# Patient Record
Sex: MALE | NOT HISPANIC OR LATINO | URBAN - METROPOLITAN AREA
[De-identification: names, ages, dates, MRNs, and addresses within clinical notes are randomized per-mention and may not be internally consistent; named-entity substitution may affect disease eponyms.]

---

## 2017-02-08 ENCOUNTER — APPOINTMENT (RX ONLY)
Dept: URBAN - METROPOLITAN AREA CLINIC 140 | Facility: CLINIC | Age: 67
Setting detail: DERMATOLOGY
End: 2017-02-08

## 2017-02-08 DIAGNOSIS — L57.8 OTHER SKIN CHANGES DUE TO CHRONIC EXPOSURE TO NONIONIZING RADIATION: ICD-10-CM

## 2017-02-08 DIAGNOSIS — D485 NEOPLASM OF UNCERTAIN BEHAVIOR OF SKIN: ICD-10-CM

## 2017-02-08 DIAGNOSIS — Z85.828 PERSONAL HISTORY OF OTHER MALIGNANT NEOPLASM OF SKIN: ICD-10-CM

## 2017-02-08 PROBLEM — D48.5 NEOPLASM OF UNCERTAIN BEHAVIOR OF SKIN: Status: ACTIVE | Noted: 2017-02-08

## 2017-02-08 PROBLEM — H91.90 UNSPECIFIED HEARING LOSS, UNSPECIFIED EAR: Status: ACTIVE | Noted: 2017-02-08

## 2017-02-08 PROCEDURE — ? BIOPSY BY SHAVE METHOD

## 2017-02-08 PROCEDURE — 99213 OFFICE O/P EST LOW 20 MIN: CPT | Mod: 25

## 2017-02-08 PROCEDURE — 11100: CPT

## 2017-02-08 PROCEDURE — ? COUNSELING

## 2017-02-08 ASSESSMENT — LOCATION DETAILED DESCRIPTION DERM
LOCATION DETAILED: RIGHT PROXIMAL DORSAL FOREARM
LOCATION DETAILED: RIGHT MEDIAL FOREHEAD
LOCATION DETAILED: LEFT PROXIMAL DORSAL FOREARM
LOCATION DETAILED: RIGHT MID TEMPLE

## 2017-02-08 ASSESSMENT — LOCATION ZONE DERM
LOCATION ZONE: FACE
LOCATION ZONE: ARM

## 2017-02-08 ASSESSMENT — LOCATION SIMPLE DESCRIPTION DERM
LOCATION SIMPLE: LEFT FOREARM
LOCATION SIMPLE: RIGHT FOREHEAD
LOCATION SIMPLE: RIGHT FOREARM
LOCATION SIMPLE: RIGHT TEMPLE

## 2017-02-08 NOTE — PROCEDURE: MIPS QUALITY
Quality 110: Preventive Care And Screening: Influenza Immunization: Influenza immunization was not ordered or administered, reason not given
Quality 402: Tobacco Use And Help With Quitting Among Adolescents: Patient screened for tobacco and never smoked
Quality 131: Pain Assessment And Follow-Up: No documentation of pain assessment, reason not given
Quality 47: Advance Care Plan: Advance care planning not documented, reason not otherwise specified.
Quality 154 Part A: Falls: Risk Assessment (Should Be Reported With Measure 155.): Falls risk assessment not completed, reason not otherwise specified
Quality 111:Pneumonia Vaccination Status For Older Adults: Pneumococcal Vaccination not Administered or Previously Received, Reason not Otherwise Specified
Quality 154 Part B: Falls: Risk Screening (Should Be Reported With Measure 155.): Patient screened for future fall risk; documentation of no falls in the past year or only one fall without injury in the past year
Quality 173: Preventive Care And Screening: Unhealthy Alcohol Use - Screening: Unhealthy alcohol use screening not performed, reason not otherwise specified
Detail Level: Detailed

## 2017-02-08 NOTE — PROCEDURE: BIOPSY BY SHAVE METHOD
Anesthesia Volume In Cc: 0.5
Bill 47985 For Specimen Handling/Conveyance To Laboratory?: no
Post-Care Instructions: I reviewed with the patient in detail post-care instructions. Patient is to keep the biopsy site dry overnight, and then apply bacitracin twice daily until healed. Patient may apply hydrogen peroxide soaks to remove any crusting.
Hemostasis: Electrocautery
Silver Nitrate Text: The wound bed was treated with silver nitrate after the biopsy was performed.
Notification Instructions: Patient will be notified of biopsy results. However, patient instructed to call the office if not contacted within 2 weeks.
Electrodesiccation Text: The wound bed was treated with electrodesiccation after the biopsy was performed.
Type Of Destruction Used: Curettage
Wound Care: Vaseline
X Size Of Lesion In Cm: 0
Biopsy Type: H and E
Dressing: bandage
Curettage Text: The wound bed was treated with curettage after the biopsy was performed.
Biopsy Method: Dermablade
Consent: Written consent was obtained and risks were reviewed including but not limited to scarring, infection, bleeding, scabbing, incomplete removal, nerve damage and allergy to anesthesia.
Billing Type: Third-Party Bill
Detail Level: Simple
Cryotherapy Text: The wound bed was treated with cryotherapy after the biopsy was performed.
Anesthesia Type: 1% lidocaine with epinephrine
Electrodesiccation And Curettage Text: The wound bed was treated with electrodesiccation and curettage after the biopsy was performed.

## 2017-04-03 ENCOUNTER — APPOINTMENT (RX ONLY)
Dept: URBAN - METROPOLITAN AREA CLINIC 140 | Facility: CLINIC | Age: 67
Setting detail: DERMATOLOGY
End: 2017-04-03

## 2017-04-03 DIAGNOSIS — L82.0 INFLAMED SEBORRHEIC KERATOSIS: ICD-10-CM

## 2017-04-03 DIAGNOSIS — L82.1 OTHER SEBORRHEIC KERATOSIS: ICD-10-CM

## 2017-04-03 DIAGNOSIS — L57.0 ACTINIC KERATOSIS: ICD-10-CM

## 2017-04-03 PROBLEM — Z92.3 PERSONAL HISTORY OF IRRADIATION: Status: ACTIVE | Noted: 2017-04-03

## 2017-04-03 PROCEDURE — ? BENIGN DESTRUCTION

## 2017-04-03 PROCEDURE — 17000 DESTRUCT PREMALG LESION: CPT | Mod: 59

## 2017-04-03 PROCEDURE — 17110 DESTRUCTION B9 LES UP TO 14: CPT

## 2017-04-03 PROCEDURE — ? COUNSELING

## 2017-04-03 PROCEDURE — 17003 DESTRUCT PREMALG LES 2-14: CPT

## 2017-04-03 PROCEDURE — ? LIQUID NITROGEN

## 2017-04-03 PROCEDURE — 99213 OFFICE O/P EST LOW 20 MIN: CPT | Mod: 25

## 2017-04-03 ASSESSMENT — LOCATION DETAILED DESCRIPTION DERM
LOCATION DETAILED: LEFT PROXIMAL DORSAL FOREARM
LOCATION DETAILED: LEFT MEDIAL EYEBROW
LOCATION DETAILED: LEFT INFERIOR TEMPLE
LOCATION DETAILED: RIGHT INFERIOR POSTERIOR NECK
LOCATION DETAILED: RIGHT INFERIOR FOREHEAD
LOCATION DETAILED: RIGHT MEDIAL ZYGOMA
LOCATION DETAILED: LEFT LATERAL INFERIOR EYELID
LOCATION DETAILED: RIGHT MEDIAL TRAPEZIAL NECK

## 2017-04-03 ASSESSMENT — LOCATION ZONE DERM
LOCATION ZONE: NECK
LOCATION ZONE: FACE
LOCATION ZONE: ARM
LOCATION ZONE: EYELID

## 2017-04-03 ASSESSMENT — LOCATION SIMPLE DESCRIPTION DERM
LOCATION SIMPLE: LEFT INFERIOR EYELID
LOCATION SIMPLE: LEFT TEMPLE
LOCATION SIMPLE: LEFT FOREARM
LOCATION SIMPLE: POSTERIOR NECK
LOCATION SIMPLE: RIGHT ZYGOMA
LOCATION SIMPLE: LEFT EYEBROW
LOCATION SIMPLE: RIGHT FOREHEAD

## 2017-04-03 NOTE — PROCEDURE: LIQUID NITROGEN
Consent: The patient's consent was obtained including but not limited to risks of crusting, scabbing, blistering, scarring, darker or lighter pigmentary change, recurrence, incomplete removal and infection.
Post-Care Instructions: I reviewed with the patient in detail post-care instructions. Patient is to wear sunprotection, and avoid picking at any of the treated lesions. Pt may apply Vaseline to crusted or scabbing areas.
Duration Of Freeze Thaw-Cycle (Seconds): 5
Render Post-Care Instructions In Note?: no
Detail Level: Simple
Number Of Freeze-Thaw Cycles: 2 freeze-thaw cycles

## 2017-04-03 NOTE — HPI: SECONDARY COMPLAINT
How Severe Is This Condition?: mild
Additional History: Glasses irritate lesions on eyes. Forearm lesion is asymptomatic.

## 2017-04-03 NOTE — PROCEDURE: BENIGN DESTRUCTION
Medical Necessity Information: It is in your best interest to select a reason for this procedure from the list below. All of these items fulfill various CMS LCD requirements except the new and changing color options.
Treatment Number (Will Not Render If 0): 0
Include Z78.9 (Other Specified Conditions Influencing Health Status) As An Associated Diagnosis?: No
Medical Necessity Clause: This procedure was medically necessary because the lesions that were treated were:
Consent: The patient's consent was obtained including but not limited to risks of crusting, scabbing, blistering, scarring, darker or lighter pigmentary change, recurrence, incomplete removal and infection.
Post-Care Instructions: I reviewed with the patient in detail post-care instructions. Patient is to wear sunprotection, and avoid picking at any of the treated lesions. Pt may apply Vaseline to crusted or scabbing areas.
Detail Level: Detailed
Anesthesia Volume In Cc: 0.5

## 2018-01-15 ENCOUNTER — APPOINTMENT (RX ONLY)
Dept: URBAN - METROPOLITAN AREA CLINIC 140 | Facility: CLINIC | Age: 68
Setting detail: DERMATOLOGY
End: 2018-01-15

## 2018-01-15 DIAGNOSIS — D18.0 HEMANGIOMA: ICD-10-CM

## 2018-01-15 DIAGNOSIS — L57.0 ACTINIC KERATOSIS: ICD-10-CM

## 2018-01-15 DIAGNOSIS — L82.1 OTHER SEBORRHEIC KERATOSIS: ICD-10-CM

## 2018-01-15 DIAGNOSIS — L81.4 OTHER MELANIN HYPERPIGMENTATION: ICD-10-CM

## 2018-01-15 PROBLEM — E78.5 HYPERLIPIDEMIA, UNSPECIFIED: Status: ACTIVE | Noted: 2018-01-15

## 2018-01-15 PROBLEM — D18.01 HEMANGIOMA OF SKIN AND SUBCUTANEOUS TISSUE: Status: ACTIVE | Noted: 2018-01-15

## 2018-01-15 PROCEDURE — 99214 OFFICE O/P EST MOD 30 MIN: CPT

## 2018-01-15 PROCEDURE — ? COUNSELING

## 2018-01-15 PROCEDURE — ? LIQUID NITROGEN

## 2018-01-15 ASSESSMENT — LOCATION DETAILED DESCRIPTION DERM
LOCATION DETAILED: LEFT PROXIMAL PRETIBIAL REGION
LOCATION DETAILED: LEFT SUPERIOR LATERAL MALAR CHEEK
LOCATION DETAILED: RIGHT INFERIOR ANTERIOR NECK
LOCATION DETAILED: RIGHT ANTERIOR DISTAL UPPER ARM
LOCATION DETAILED: RIGHT PROXIMAL PRETIBIAL REGION
LOCATION DETAILED: RIGHT LATERAL FOREHEAD
LOCATION DETAILED: LEFT LATERAL MALAR CHEEK
LOCATION DETAILED: RIGHT PROXIMAL RADIAL DORSAL FOREARM
LOCATION DETAILED: RIGHT POSTERIOR SHOULDER
LOCATION DETAILED: LEFT DISTAL ULNAR DORSAL FOREARM
LOCATION DETAILED: LEFT POPLITEAL SKIN
LOCATION DETAILED: LEFT LATERAL PROXIMAL PRETIBIAL REGION
LOCATION DETAILED: LEFT PROXIMAL CALF
LOCATION DETAILED: LEFT MEDIAL SUPERIOR CHEST
LOCATION DETAILED: XIPHOID
LOCATION DETAILED: RIGHT PROXIMAL DORSAL FOREARM
LOCATION DETAILED: RIGHT ANTERIOR DISTAL THIGH

## 2018-01-15 ASSESSMENT — LOCATION ZONE DERM
LOCATION ZONE: TRUNK
LOCATION ZONE: LEG
LOCATION ZONE: FACE
LOCATION ZONE: ARM
LOCATION ZONE: NECK

## 2018-01-15 ASSESSMENT — LOCATION SIMPLE DESCRIPTION DERM
LOCATION SIMPLE: LEFT POPLITEAL SKIN
LOCATION SIMPLE: RIGHT ANTERIOR NECK
LOCATION SIMPLE: RIGHT UPPER ARM
LOCATION SIMPLE: ABDOMEN
LOCATION SIMPLE: LEFT PRETIBIAL REGION
LOCATION SIMPLE: RIGHT THIGH
LOCATION SIMPLE: LEFT CHEEK
LOCATION SIMPLE: RIGHT FOREARM
LOCATION SIMPLE: LEFT FOREARM
LOCATION SIMPLE: RIGHT PRETIBIAL REGION
LOCATION SIMPLE: LEFT CALF
LOCATION SIMPLE: RIGHT FOREHEAD
LOCATION SIMPLE: CHEST
LOCATION SIMPLE: RIGHT SHOULDER

## 2018-01-15 NOTE — PROCEDURE: MIPS QUALITY
Quality 47: Advance Care Plan: Advance Care Planning discussed and documented in the medical record; patient did not wish or was not able to name a surrogate decision maker or provide an advance care plan.
Quality 111:Pneumonia Vaccination Status For Older Adults: Pneumococcal Vaccination Previously Received
Quality 154 Part A: Falls: Risk Assessment (Should Be Reported With Measure 155.): Falls risk assessment completed and documented in the past 12 months.
Detail Level: Detailed
Quality 110: Preventive Care And Screening: Influenza Immunization: Influenza Immunization Administered during Influenza season
Quality 131: Pain Assessment And Follow-Up: Pain assessment using a standardized tool is documented as negative, no follow-up plan required
Quality 226: Preventive Care And Screening: Tobacco Use: Screening And Cessation Intervention: Patient screened for tobacco and never smoked
Quality 431: Preventive Care And Screening: Unhealthy Alcohol Use - Screening: Patient screened for unhealthy alcohol use using a single question and scores less than 2 times per year
Quality 155 (Denominator): Falls Plan Of Care: Plan of Care not Documented, Reason not Otherwise Specified
Quality 154 Part B: Falls: Risk Screening (Should Be Reported With Measure 155.): Patient screened for future fall risk; documentation of no falls in the past year or only one fall without injury in the past year

## 2018-01-15 NOTE — PROCEDURE: LIQUID NITROGEN
Include Z78.9 (Other Specified Conditions Influencing Health Status) As An Associated Diagnosis?: No
Post-Care Instructions: I reviewed with the patient in detail post-care instructions. Patient is to wear sunprotection, and avoid picking at any of the treated lesions. Pt may apply Vaseline to crusted or scabbing areas.
Consent: The patient's consent was obtained including but not limited to risks of crusting, scabbing, blistering, scarring, darker or lighter pigmentary change, recurrence, incomplete removal and infection.
Medical Necessity Information: It is in your best interest to select a reason for this procedure from the list below. All of these items fulfill various CMS LCD requirements except the new and changing color options.
Detail Level: Detailed
Medical Necessity Clause: This procedure was medically necessary because the lesions that were treated were:
Duration Of Freeze Thaw-Cycle (Seconds): 0

## 2018-04-09 ENCOUNTER — APPOINTMENT (RX ONLY)
Dept: URBAN - METROPOLITAN AREA CLINIC 140 | Facility: CLINIC | Age: 68
Setting detail: DERMATOLOGY
End: 2018-04-09

## 2018-04-09 DIAGNOSIS — D22 MELANOCYTIC NEVI: ICD-10-CM

## 2018-04-09 DIAGNOSIS — L81.4 OTHER MELANIN HYPERPIGMENTATION: ICD-10-CM

## 2018-04-09 DIAGNOSIS — D18.0 HEMANGIOMA: ICD-10-CM

## 2018-04-09 DIAGNOSIS — Z85.828 PERSONAL HISTORY OF OTHER MALIGNANT NEOPLASM OF SKIN: ICD-10-CM

## 2018-04-09 DIAGNOSIS — L82.1 OTHER SEBORRHEIC KERATOSIS: ICD-10-CM

## 2018-04-09 DIAGNOSIS — L85.3 XEROSIS CUTIS: ICD-10-CM

## 2018-04-09 PROBLEM — Z85.46 PERSONAL HISTORY OF MALIGNANT NEOPLASM OF PROSTATE: Status: ACTIVE | Noted: 2018-04-09

## 2018-04-09 PROBLEM — E78.5 HYPERLIPIDEMIA, UNSPECIFIED: Status: ACTIVE | Noted: 2018-04-09

## 2018-04-09 PROBLEM — H91.90 UNSPECIFIED HEARING LOSS, UNSPECIFIED EAR: Status: ACTIVE | Noted: 2018-04-09

## 2018-04-09 PROBLEM — D18.01 HEMANGIOMA OF SKIN AND SUBCUTANEOUS TISSUE: Status: ACTIVE | Noted: 2018-04-09

## 2018-04-09 PROBLEM — D22.5 MELANOCYTIC NEVI OF TRUNK: Status: ACTIVE | Noted: 2018-04-09

## 2018-04-09 PROCEDURE — ? COUNSELING

## 2018-04-09 PROCEDURE — 99214 OFFICE O/P EST MOD 30 MIN: CPT

## 2018-04-09 PROCEDURE — ? TREATMENT REGIMEN

## 2018-04-09 ASSESSMENT — LOCATION ZONE DERM
LOCATION ZONE: FACE
LOCATION ZONE: TRUNK

## 2018-04-09 ASSESSMENT — LOCATION DETAILED DESCRIPTION DERM
LOCATION DETAILED: RIGHT INFERIOR MEDIAL MALAR CHEEK
LOCATION DETAILED: LEFT SUPERIOR UPPER BACK
LOCATION DETAILED: LEFT INFERIOR MEDIAL UPPER BACK
LOCATION DETAILED: LEFT INFERIOR UPPER BACK
LOCATION DETAILED: RIGHT SUPERIOR UPPER BACK
LOCATION DETAILED: LEFT MID-UPPER BACK

## 2018-04-09 ASSESSMENT — LOCATION SIMPLE DESCRIPTION DERM
LOCATION SIMPLE: RIGHT UPPER BACK
LOCATION SIMPLE: LEFT UPPER BACK
LOCATION SIMPLE: RIGHT CHEEK

## 2018-04-09 NOTE — PROCEDURE: MIPS QUALITY
Quality 154 Part B: Falls: Risk Screening (Should Be Reported With Measure 155.): Patient screened for future fall risk; documentation of no falls in the past year or only one fall without injury in the past year
Quality 154 Part A: Falls: Risk Assessment (Should Be Reported With Measure 155.): Falls risk assessment completed and documented in the past 12 months.
Quality 431: Preventive Care And Screening: Unhealthy Alcohol Use - Screening: Patient screened for unhealthy alcohol use using a single question and scores less than 2 times per year
Quality 131: Pain Assessment And Follow-Up: Pain assessment using a standardized tool is documented as negative, no follow-up plan required
Detail Level: Detailed
Quality 111:Pneumonia Vaccination Status For Older Adults: Pneumococcal Vaccination not Administered or Previously Received, Reason not Otherwise Specified
Quality 226: Preventive Care And Screening: Tobacco Use: Screening And Cessation Intervention: Patient screened for tobacco and never smoked
Quality 47: Advance Care Plan: Advance Care Planning discussed and documented in the medical record; patient did not wish or was not able to name a surrogate decision maker or provide an advance care plan.
Quality 110: Preventive Care And Screening: Influenza Immunization: Influenza Immunization Ordered or Recommended, but not Administered due to system reason
Quality 155 (Denominator): Falls Plan Of Care: Plan of Care not Documented, Reason not Otherwise Specified

## 2018-10-23 ENCOUNTER — APPOINTMENT (RX ONLY)
Dept: URBAN - METROPOLITAN AREA CLINIC 140 | Facility: CLINIC | Age: 68
Setting detail: DERMATOLOGY
End: 2018-10-23

## 2018-10-23 DIAGNOSIS — Z85.828 PERSONAL HISTORY OF OTHER MALIGNANT NEOPLASM OF SKIN: ICD-10-CM

## 2018-10-23 DIAGNOSIS — L81.4 OTHER MELANIN HYPERPIGMENTATION: ICD-10-CM

## 2018-10-23 DIAGNOSIS — L57.0 ACTINIC KERATOSIS: ICD-10-CM

## 2018-10-23 DIAGNOSIS — D18.0 HEMANGIOMA: ICD-10-CM

## 2018-10-23 DIAGNOSIS — L82.1 OTHER SEBORRHEIC KERATOSIS: ICD-10-CM

## 2018-10-23 DIAGNOSIS — D22 MELANOCYTIC NEVI: ICD-10-CM

## 2018-10-23 PROBLEM — D18.01 HEMANGIOMA OF SKIN AND SUBCUTANEOUS TISSUE: Status: ACTIVE | Noted: 2018-10-23

## 2018-10-23 PROBLEM — Z85.46 PERSONAL HISTORY OF MALIGNANT NEOPLASM OF PROSTATE: Status: ACTIVE | Noted: 2018-10-23

## 2018-10-23 PROBLEM — Z92.3 PERSONAL HISTORY OF IRRADIATION: Status: ACTIVE | Noted: 2018-10-23

## 2018-10-23 PROBLEM — E78.5 HYPERLIPIDEMIA, UNSPECIFIED: Status: ACTIVE | Noted: 2018-10-23

## 2018-10-23 PROBLEM — D22.5 MELANOCYTIC NEVI OF TRUNK: Status: ACTIVE | Noted: 2018-10-23

## 2018-10-23 PROBLEM — I10 ESSENTIAL (PRIMARY) HYPERTENSION: Status: ACTIVE | Noted: 2018-10-23

## 2018-10-23 PROCEDURE — 17004 DESTROY PREMAL LESIONS 15/>: CPT

## 2018-10-23 PROCEDURE — ? COUNSELING

## 2018-10-23 PROCEDURE — 99213 OFFICE O/P EST LOW 20 MIN: CPT | Mod: 25

## 2018-10-23 PROCEDURE — ? LIQUID NITROGEN

## 2018-10-23 ASSESSMENT — LOCATION DETAILED DESCRIPTION DERM
LOCATION DETAILED: RIGHT PROXIMAL DORSAL FOREARM
LOCATION DETAILED: RIGHT SUPERIOR LATERAL UPPER BACK
LOCATION DETAILED: LEFT INFERIOR FOREHEAD
LOCATION DETAILED: LEFT VENTRAL PROXIMAL FOREARM
LOCATION DETAILED: LEFT DISTAL ULNAR DORSAL FOREARM
LOCATION DETAILED: RIGHT CENTRAL TEMPLE
LOCATION DETAILED: RIGHT DISTAL RADIAL DORSAL FOREARM
LOCATION DETAILED: LEFT LATERAL TEMPLE
LOCATION DETAILED: RIGHT INFERIOR MEDIAL MALAR CHEEK
LOCATION DETAILED: RIGHT LATERAL UPPER BACK
LOCATION DETAILED: LEFT VENTRAL DISTAL FOREARM
LOCATION DETAILED: RIGHT PROXIMAL RADIAL DORSAL FOREARM
LOCATION DETAILED: LEFT DISTAL RADIAL DORSAL FOREARM
LOCATION DETAILED: LEFT PROXIMAL DORSAL FOREARM
LOCATION DETAILED: RIGHT DISTAL DORSAL FOREARM

## 2018-10-23 ASSESSMENT — LOCATION ZONE DERM
LOCATION ZONE: TRUNK
LOCATION ZONE: ARM
LOCATION ZONE: FACE

## 2018-10-23 ASSESSMENT — LOCATION SIMPLE DESCRIPTION DERM
LOCATION SIMPLE: LEFT FOREHEAD
LOCATION SIMPLE: RIGHT UPPER BACK
LOCATION SIMPLE: LEFT FOREARM
LOCATION SIMPLE: LEFT TEMPLE
LOCATION SIMPLE: RIGHT FOREARM
LOCATION SIMPLE: RIGHT CHEEK
LOCATION SIMPLE: RIGHT TEMPLE

## 2018-10-23 NOTE — PROCEDURE: MIPS QUALITY
Quality 110: Preventive Care And Screening: Influenza Immunization: Influenza Immunization Ordered or Recommended, but not Administered due to system reason
Quality 431: Preventive Care And Screening: Unhealthy Alcohol Use - Screening: Patient screened for unhealthy alcohol use using a single question and scores less than 2 times per year
Quality 155 (Denominator): Falls Plan Of Care: Plan of Care not Documented, Reason not Otherwise Specified
Quality 154 Part B: Falls: Risk Screening (Should Be Reported With Measure 155.): Patient screened for future fall risk; documentation of no falls in the past year or only one fall without injury in the past year
Quality 154 Part A: Falls: Risk Assessment (Should Be Reported With Measure 155.): Falls risk assessment completed and documented in the past 12 months.
Quality 131: Pain Assessment And Follow-Up: Pain assessment using a standardized tool is documented as negative, no follow-up plan required
Quality 226: Preventive Care And Screening: Tobacco Use: Screening And Cessation Intervention: Patient screened for tobacco and never smoked
Quality 47: Advance Care Plan: Advance Care Planning discussed and documented in the medical record; patient did not wish or was not able to name a surrogate decision maker or provide an advance care plan.
Detail Level: Detailed
Quality 111:Pneumonia Vaccination Status For Older Adults: Pneumococcal Vaccination not Administered or Previously Received, Reason not Otherwise Specified

## 2019-04-25 ENCOUNTER — APPOINTMENT (RX ONLY)
Dept: URBAN - METROPOLITAN AREA CLINIC 140 | Facility: CLINIC | Age: 69
Setting detail: DERMATOLOGY
End: 2019-04-25

## 2019-04-25 DIAGNOSIS — D22 MELANOCYTIC NEVI: ICD-10-CM

## 2019-04-25 DIAGNOSIS — Z85.828 PERSONAL HISTORY OF OTHER MALIGNANT NEOPLASM OF SKIN: ICD-10-CM

## 2019-04-25 DIAGNOSIS — D485 NEOPLASM OF UNCERTAIN BEHAVIOR OF SKIN: ICD-10-CM

## 2019-04-25 DIAGNOSIS — L81.4 OTHER MELANIN HYPERPIGMENTATION: ICD-10-CM

## 2019-04-25 DIAGNOSIS — L82.1 OTHER SEBORRHEIC KERATOSIS: ICD-10-CM

## 2019-04-25 DIAGNOSIS — D18.0 HEMANGIOMA: ICD-10-CM

## 2019-04-25 PROBLEM — D48.5 NEOPLASM OF UNCERTAIN BEHAVIOR OF SKIN: Status: ACTIVE | Noted: 2019-04-25

## 2019-04-25 PROBLEM — D22.5 MELANOCYTIC NEVI OF TRUNK: Status: ACTIVE | Noted: 2019-04-25

## 2019-04-25 PROBLEM — I10 ESSENTIAL (PRIMARY) HYPERTENSION: Status: ACTIVE | Noted: 2019-04-25

## 2019-04-25 PROBLEM — D18.01 HEMANGIOMA OF SKIN AND SUBCUTANEOUS TISSUE: Status: ACTIVE | Noted: 2019-04-25

## 2019-04-25 PROBLEM — H91.90 UNSPECIFIED HEARING LOSS, UNSPECIFIED EAR: Status: ACTIVE | Noted: 2019-04-25

## 2019-04-25 PROCEDURE — 99214 OFFICE O/P EST MOD 30 MIN: CPT | Mod: 25

## 2019-04-25 PROCEDURE — 11103 TANGNTL BX SKIN EA SEP/ADDL: CPT

## 2019-04-25 PROCEDURE — 11102 TANGNTL BX SKIN SINGLE LES: CPT

## 2019-04-25 PROCEDURE — ? COUNSELING

## 2019-04-25 PROCEDURE — ? BIOPSY BY SHAVE METHOD

## 2019-04-25 ASSESSMENT — LOCATION ZONE DERM
LOCATION ZONE: LEG
LOCATION ZONE: FACE
LOCATION ZONE: SCALP
LOCATION ZONE: TRUNK

## 2019-04-25 ASSESSMENT — LOCATION DETAILED DESCRIPTION DERM
LOCATION DETAILED: RIGHT SUPERIOR LATERAL UPPER BACK
LOCATION DETAILED: LEFT PROXIMAL PRETIBIAL REGION
LOCATION DETAILED: RIGHT LATERAL UPPER BACK
LOCATION DETAILED: LEFT INFERIOR POSTAURICULAR SKIN
LOCATION DETAILED: RIGHT INFERIOR MEDIAL MALAR CHEEK

## 2019-04-25 ASSESSMENT — LOCATION SIMPLE DESCRIPTION DERM
LOCATION SIMPLE: RIGHT CHEEK
LOCATION SIMPLE: LEFT PRETIBIAL REGION
LOCATION SIMPLE: RIGHT UPPER BACK
LOCATION SIMPLE: SCALP

## 2019-04-25 NOTE — PROCEDURE: BIOPSY BY SHAVE METHOD
Depth Of Biopsy: dermis
Notification Instructions: Patient will be notified of biopsy results. However, patient instructed to call the office if not contacted within 2 weeks.
Anesthesia Type: 1% lidocaine with epinephrine
Render Post-Care Instructions In Note?: no
Silver Nitrate Text: The wound bed was treated with silver nitrate after the biopsy was performed.
Dressing: bandage
Size Of Lesion In Cm: 0
Hemostasis: Electrocautery
Billing Type: Third-Party Bill
Biopsy Method: Dermablade
Post-Care Instructions: I reviewed with the patient in detail post-care instructions. Patient is to keep the biopsy site dry overnight, and then apply bacitracin twice daily until healed. Patient may apply hydrogen peroxide soaks to remove any crusting.
Electrodesiccation And Curettage Text: The wound bed was treated with electrodesiccation and curettage after the biopsy was performed.
Type Of Destruction Used: Curettage
Wound Care: Vaseline
Detail Level: Detailed
Anesthesia Volume In Cc: 1
Was A Bandage Applied: Yes
Biopsy Type: H and E
Consent: Written consent was obtained and risks were reviewed including but not limited to scarring, infection, bleeding, scabbing, incomplete removal, nerve damage and allergy to anesthesia.
Cryotherapy Text: The wound bed was treated with cryotherapy after the biopsy was performed.
Electrodesiccation Text: The wound bed was treated with electrodesiccation after the biopsy was performed.

## 2019-04-25 NOTE — PROCEDURE: MIPS QUALITY
Quality 47: Advance Care Plan: Advance Care Planning discussed and documented in the medical record; patient did not wish or was not able to name a surrogate decision maker or provide an advance care plan.
Quality 154 Part A: Falls: Risk Assessment (Should Be Reported With Measure 155.): Falls risk assessment completed and documented in the past 12 months.
Quality 131: Pain Assessment And Follow-Up: Pain assessment using a standardized tool is documented as negative, no follow-up plan required
Quality 154 Part B: Falls: Risk Screening (Should Be Reported With Measure 155.): Patient screened for future fall risk; documentation of no falls in the past year or only one fall without injury in the past year
Detail Level: Detailed
Quality 111:Pneumonia Vaccination Status For Older Adults: Pneumococcal Vaccination not Administered or Previously Received, Reason not Otherwise Specified
Quality 226: Preventive Care And Screening: Tobacco Use: Screening And Cessation Intervention: Patient screened for tobacco and never smoked
Quality 110: Preventive Care And Screening: Influenza Immunization: Influenza Immunization Ordered or Recommended, but not Administered due to system reason
Quality 431: Preventive Care And Screening: Unhealthy Alcohol Use - Screening: Patient screened for unhealthy alcohol use using a single question and scores less than 2 times per year
Quality 155 (Denominator): Falls Plan Of Care: Plan of Care not Documented, Reason not Otherwise Specified

## 2019-05-21 ENCOUNTER — APPOINTMENT (RX ONLY)
Dept: URBAN - METROPOLITAN AREA CLINIC 140 | Facility: CLINIC | Age: 69
Setting detail: DERMATOLOGY
End: 2019-05-21

## 2019-05-21 DIAGNOSIS — L82.0 INFLAMED SEBORRHEIC KERATOSIS: ICD-10-CM

## 2019-05-21 DIAGNOSIS — L57.0 ACTINIC KERATOSIS: ICD-10-CM

## 2019-05-21 PROBLEM — I10 ESSENTIAL (PRIMARY) HYPERTENSION: Status: ACTIVE | Noted: 2019-05-21

## 2019-05-21 PROCEDURE — 17000 DESTRUCT PREMALG LESION: CPT | Mod: 59

## 2019-05-21 PROCEDURE — ? LIQUID NITROGEN

## 2019-05-21 PROCEDURE — 17003 DESTRUCT PREMALG LES 2-14: CPT | Mod: 59

## 2019-05-21 PROCEDURE — ? COUNSELING

## 2019-05-21 PROCEDURE — 17110 DESTRUCTION B9 LES UP TO 14: CPT

## 2019-05-21 ASSESSMENT — LOCATION ZONE DERM
LOCATION ZONE: SCALP
LOCATION ZONE: LEG
LOCATION ZONE: HAND

## 2019-05-21 ASSESSMENT — LOCATION SIMPLE DESCRIPTION DERM
LOCATION SIMPLE: LEFT PRETIBIAL REGION
LOCATION SIMPLE: SCALP
LOCATION SIMPLE: RIGHT HAND

## 2019-05-21 ASSESSMENT — LOCATION DETAILED DESCRIPTION DERM
LOCATION DETAILED: RIGHT RADIAL DORSAL HAND
LOCATION DETAILED: LEFT PROXIMAL PRETIBIAL REGION
LOCATION DETAILED: LEFT CENTRAL POSTAURICULAR SKIN

## 2019-05-21 NOTE — PROCEDURE: MIPS QUALITY
Quality 155 (Denominator): Falls Plan Of Care: Plan of Care not Documented, Reason not Otherwise Specified
Quality 131: Pain Assessment And Follow-Up: Pain assessment using a standardized tool is documented as negative, no follow-up plan required
Quality 154 Part B: Falls: Risk Screening (Should Be Reported With Measure 155.): Patient screened for future fall risk; documentation of no falls in the past year or only one fall without injury in the past year
Quality 226: Preventive Care And Screening: Tobacco Use: Screening And Cessation Intervention: Patient screened for tobacco and never smoked
Detail Level: Detailed
Quality 111:Pneumonia Vaccination Status For Older Adults: Pneumococcal Vaccination not Administered or Previously Received, Reason not Otherwise Specified
Quality 110: Preventive Care And Screening: Influenza Immunization: Influenza Immunization Ordered or Recommended, but not Administered due to system reason
Quality 154 Part A: Falls: Risk Assessment (Should Be Reported With Measure 155.): Falls risk assessment completed and documented in the past 12 months.
Quality 47: Advance Care Plan: Advance Care Planning discussed and documented in the medical record; patient did not wish or was not able to name a surrogate decision maker or provide an advance care plan.
Quality 431: Preventive Care And Screening: Unhealthy Alcohol Use - Screening: Patient screened for unhealthy alcohol use using a single question and scores less than 2 times per year

## 2019-05-21 NOTE — PROCEDURE: LIQUID NITROGEN
Post-Care Instructions: I reviewed with the patient in detail post-care instructions. Patient is to wear sunprotection, and avoid picking at any of the treated lesions. Pt may apply Vaseline to crusted or scabbing areas.
Render Post-Care Instructions In Note?: no
Medical Necessity Information: It is in your best interest to select a reason for this procedure from the list below. All of these items fulfill various CMS LCD requirements except the new and changing color options.
Duration Of Freeze Thaw-Cycle (Seconds): 10
Detail Level: Detailed
Medical Necessity Clause: This procedure was medically necessary because the lesions that were treated were:
Number Of Freeze-Thaw Cycles: 3 freeze-thaw cycles
Number Of Freeze-Thaw Cycles: 2 freeze-thaw cycles
Consent: The patient's consent was obtained including but not limited to risks of crusting, scabbing, blistering, scarring, darker or lighter pigmentary change, recurrence, incomplete removal and infection.

## 2019-11-12 ENCOUNTER — APPOINTMENT (RX ONLY)
Dept: URBAN - METROPOLITAN AREA CLINIC 140 | Facility: CLINIC | Age: 69
Setting detail: DERMATOLOGY
End: 2019-11-12

## 2019-11-12 DIAGNOSIS — L57.0 ACTINIC KERATOSIS: ICD-10-CM

## 2019-11-12 DIAGNOSIS — D22 MELANOCYTIC NEVI: ICD-10-CM

## 2019-11-12 DIAGNOSIS — D18.0 HEMANGIOMA: ICD-10-CM

## 2019-11-12 DIAGNOSIS — Z85.828 PERSONAL HISTORY OF OTHER MALIGNANT NEOPLASM OF SKIN: ICD-10-CM

## 2019-11-12 DIAGNOSIS — L81.4 OTHER MELANIN HYPERPIGMENTATION: ICD-10-CM

## 2019-11-12 DIAGNOSIS — L82.1 OTHER SEBORRHEIC KERATOSIS: ICD-10-CM

## 2019-11-12 PROBLEM — I10 ESSENTIAL (PRIMARY) HYPERTENSION: Status: ACTIVE | Noted: 2019-11-12

## 2019-11-12 PROBLEM — D18.01 HEMANGIOMA OF SKIN AND SUBCUTANEOUS TISSUE: Status: ACTIVE | Noted: 2019-11-12

## 2019-11-12 PROBLEM — H91.90 UNSPECIFIED HEARING LOSS, UNSPECIFIED EAR: Status: ACTIVE | Noted: 2019-11-12

## 2019-11-12 PROBLEM — E78.5 HYPERLIPIDEMIA, UNSPECIFIED: Status: ACTIVE | Noted: 2019-11-12

## 2019-11-12 PROBLEM — D22.5 MELANOCYTIC NEVI OF TRUNK: Status: ACTIVE | Noted: 2019-11-12

## 2019-11-12 PROCEDURE — ? COUNSELING

## 2019-11-12 PROCEDURE — 99214 OFFICE O/P EST MOD 30 MIN: CPT | Mod: 25

## 2019-11-12 PROCEDURE — 17000 DESTRUCT PREMALG LESION: CPT

## 2019-11-12 PROCEDURE — 17003 DESTRUCT PREMALG LES 2-14: CPT

## 2019-11-12 PROCEDURE — ? LIQUID NITROGEN

## 2019-11-12 ASSESSMENT — LOCATION DETAILED DESCRIPTION DERM
LOCATION DETAILED: RIGHT SUPERIOR HELIX
LOCATION DETAILED: LEFT SUPERIOR CENTRAL MALAR CHEEK
LOCATION DETAILED: RIGHT LATERAL UPPER BACK
LOCATION DETAILED: LEFT INFERIOR TEMPLE
LOCATION DETAILED: RIGHT INFERIOR MEDIAL MALAR CHEEK
LOCATION DETAILED: LEFT CENTRAL MALAR CHEEK
LOCATION DETAILED: RIGHT INFERIOR CENTRAL MALAR CHEEK
LOCATION DETAILED: RIGHT CENTRAL MALAR CHEEK
LOCATION DETAILED: RIGHT SUPERIOR LATERAL UPPER BACK

## 2019-11-12 ASSESSMENT — LOCATION SIMPLE DESCRIPTION DERM
LOCATION SIMPLE: LEFT TEMPLE
LOCATION SIMPLE: LEFT CHEEK
LOCATION SIMPLE: RIGHT CHEEK
LOCATION SIMPLE: RIGHT UPPER BACK
LOCATION SIMPLE: RIGHT EAR

## 2019-11-12 ASSESSMENT — LOCATION ZONE DERM
LOCATION ZONE: TRUNK
LOCATION ZONE: EAR
LOCATION ZONE: FACE

## 2019-11-12 NOTE — PROCEDURE: MIPS QUALITY
Quality 154 Part A: Falls: Risk Assessment (Should Be Reported With Measure 155.): Falls risk assessment completed and documented in the past 12 months.
Quality 47: Advance Care Plan: Advance Care Planning discussed and documented in the medical record; patient did not wish or was not able to name a surrogate decision maker or provide an advance care plan.
Quality 226: Preventive Care And Screening: Tobacco Use: Screening And Cessation Intervention: Patient screened for tobacco use and is an ex/non-smoker
Detail Level: Detailed
Quality 131: Pain Assessment And Follow-Up: Pain assessment using a standardized tool is documented as negative, no follow-up plan required
Quality 110: Preventive Care And Screening: Influenza Immunization: Influenza Immunization Ordered or Recommended, but not Administered due to system reason
Quality 111:Pneumonia Vaccination Status For Older Adults: Pneumococcal Vaccination not Administered or Previously Received, Reason not Otherwise Specified
Quality 155 (Denominator): Falls Plan Of Care: Plan of Care not Documented, Reason not Otherwise Specified
Quality 154 Part B: Falls: Risk Screening (Should Be Reported With Measure 155.): Patient screened for future fall risk; documentation of no falls in the past year or only one fall without injury in the past year
Quality 431: Preventive Care And Screening: Unhealthy Alcohol Use - Screening: Patient screened for unhealthy alcohol use using a single question and scores less than 2 times per year

## 2020-05-12 ENCOUNTER — APPOINTMENT (RX ONLY)
Dept: URBAN - METROPOLITAN AREA CLINIC 140 | Facility: CLINIC | Age: 70
Setting detail: DERMATOLOGY
End: 2020-05-12

## 2020-05-12 DIAGNOSIS — L82.0 INFLAMED SEBORRHEIC KERATOSIS: ICD-10-CM

## 2020-05-12 DIAGNOSIS — L82.1 OTHER SEBORRHEIC KERATOSIS: ICD-10-CM

## 2020-05-12 DIAGNOSIS — Z85.828 PERSONAL HISTORY OF OTHER MALIGNANT NEOPLASM OF SKIN: ICD-10-CM

## 2020-05-12 DIAGNOSIS — D22 MELANOCYTIC NEVI: ICD-10-CM

## 2020-05-12 DIAGNOSIS — L21.8 OTHER SEBORRHEIC DERMATITIS: ICD-10-CM

## 2020-05-12 DIAGNOSIS — L81.4 OTHER MELANIN HYPERPIGMENTATION: ICD-10-CM

## 2020-05-12 DIAGNOSIS — L57.0 ACTINIC KERATOSIS: ICD-10-CM

## 2020-05-12 DIAGNOSIS — D18.0 HEMANGIOMA: ICD-10-CM

## 2020-05-12 PROBLEM — Z85.46 PERSONAL HISTORY OF MALIGNANT NEOPLASM OF PROSTATE: Status: ACTIVE | Noted: 2020-05-12

## 2020-05-12 PROBLEM — D18.01 HEMANGIOMA OF SKIN AND SUBCUTANEOUS TISSUE: Status: ACTIVE | Noted: 2020-05-12

## 2020-05-12 PROBLEM — D22.5 MELANOCYTIC NEVI OF TRUNK: Status: ACTIVE | Noted: 2020-05-12

## 2020-05-12 PROBLEM — H91.90 UNSPECIFIED HEARING LOSS, UNSPECIFIED EAR: Status: ACTIVE | Noted: 2020-05-12

## 2020-05-12 PROCEDURE — 17004 DESTROY PREMAL LESIONS 15/>: CPT

## 2020-05-12 PROCEDURE — ? COUNSELING

## 2020-05-12 PROCEDURE — ? LIQUID NITROGEN

## 2020-05-12 PROCEDURE — 17110 DESTRUCTION B9 LES UP TO 14: CPT | Mod: 59

## 2020-05-12 PROCEDURE — 99214 OFFICE O/P EST MOD 30 MIN: CPT | Mod: 25

## 2020-05-12 ASSESSMENT — LOCATION DETAILED DESCRIPTION DERM
LOCATION DETAILED: LEFT SUPERIOR CENTRAL BUCCAL CHEEK
LOCATION DETAILED: LEFT CENTRAL FRONTAL SCALP
LOCATION DETAILED: LEFT CENTRAL TEMPLE
LOCATION DETAILED: LEFT DISTAL DORSAL FOREARM
LOCATION DETAILED: RIGHT ANTIHELIX
LOCATION DETAILED: LEFT MEDIAL TEMPLE
LOCATION DETAILED: RIGHT CENTRAL MALAR CHEEK
LOCATION DETAILED: RIGHT LATERAL UPPER BACK
LOCATION DETAILED: RIGHT VENTRAL PROXIMAL FOREARM
LOCATION DETAILED: RIGHT DISTAL DORSAL FOREARM
LOCATION DETAILED: RIGHT PROXIMAL DORSAL FOREARM
LOCATION DETAILED: LEFT PROXIMAL DORSAL FOREARM
LOCATION DETAILED: PERIUMBILICAL SKIN
LOCATION DETAILED: LEFT INFERIOR MEDIAL MALAR CHEEK
LOCATION DETAILED: RIGHT FOREHEAD
LOCATION DETAILED: RIGHT SUPERIOR LATERAL UPPER BACK
LOCATION DETAILED: RIGHT CENTRAL LATERAL NECK
LOCATION DETAILED: LEFT SUPERIOR LATERAL NECK
LOCATION DETAILED: LEFT DISTAL RADIAL DORSAL FOREARM
LOCATION DETAILED: RIGHT INFERIOR MEDIAL MALAR CHEEK
LOCATION DETAILED: LEFT DISTAL POSTERIOR UPPER ARM
LOCATION DETAILED: RIGHT SUPERIOR MEDIAL MALAR CHEEK
LOCATION DETAILED: LEFT CYMBA CONCHA

## 2020-05-12 ASSESSMENT — LOCATION SIMPLE DESCRIPTION DERM
LOCATION SIMPLE: LEFT UPPER ARM
LOCATION SIMPLE: RIGHT EAR
LOCATION SIMPLE: NECK
LOCATION SIMPLE: RIGHT UPPER BACK
LOCATION SIMPLE: RIGHT FOREHEAD
LOCATION SIMPLE: LEFT CHEEK
LOCATION SIMPLE: ABDOMEN
LOCATION SIMPLE: LEFT FOREARM
LOCATION SIMPLE: RIGHT CHEEK
LOCATION SIMPLE: LEFT TEMPLE
LOCATION SIMPLE: RIGHT FOREARM
LOCATION SIMPLE: SCALP
LOCATION SIMPLE: LEFT EAR

## 2020-05-12 ASSESSMENT — LOCATION ZONE DERM
LOCATION ZONE: EAR
LOCATION ZONE: FACE
LOCATION ZONE: TRUNK
LOCATION ZONE: SCALP
LOCATION ZONE: ARM
LOCATION ZONE: NECK

## 2020-05-12 NOTE — HPI: FULL BODY SKIN EXAMINATION
What Type Of Note Output Would You Prefer (Optional)?: Standard Output
What Is The Reason For Today's Visit?: Full Body Skin Examination
What Is The Reason For Today's Visit? (Being Monitored For X): the development of new lesions
How Severe Are Your Spot(S)?: mild
Additional History: Pt has multiple spots he would like checked.

## 2020-05-12 NOTE — PROCEDURE: MIPS QUALITY
Quality 226: Preventive Care And Screening: Tobacco Use: Screening And Cessation Intervention: Patient screened for tobacco use and is an ex/non-smoker
Quality 131: Pain Assessment And Follow-Up: Pain assessment using a standardized tool is documented as negative, no follow-up plan required
Quality 431: Preventive Care And Screening: Unhealthy Alcohol Use - Screening: Patient screened for unhealthy alcohol use using a single question and scores less than 2 times per year
Quality 47: Advance Care Plan: Advance Care Planning discussed and documented in the medical record; patient did not wish or was not able to name a surrogate decision maker or provide an advance care plan.
Quality 110: Preventive Care And Screening: Influenza Immunization: Influenza Immunization Ordered or Recommended, but not Administered due to system reason
Quality 154 Part B: Falls: Risk Screening (Should Be Reported With Measure 155.): Patient screened for future fall risk; documentation of no falls in the past year or only one fall without injury in the past year
Quality 155 (Denominator): Falls Plan Of Care: Plan of Care not Documented, Reason not Otherwise Specified
Quality 111:Pneumonia Vaccination Status For Older Adults: Pneumococcal Vaccination not Administered or Previously Received, Reason not Otherwise Specified
Quality 154 Part A: Falls: Risk Assessment (Should Be Reported With Measure 155.): Falls risk assessment completed and documented in the past 12 months.
Detail Level: Detailed

## 2020-05-12 NOTE — PROCEDURE: LIQUID NITROGEN
Post-Care Instructions: I reviewed with the patient in detail post-care instructions. Patient is to wear sunprotection, and avoid picking at any of the treated lesions. Pt may apply Vaseline to crusted or scabbing areas.
Consent: The patient's consent was obtained including but not limited to risks of crusting, scabbing, blistering, scarring, darker or lighter pigmentary change, recurrence, incomplete removal and infection.
Medical Necessity Information: It is in your best interest to select a reason for this procedure from the list below. All of these items fulfill various CMS LCD requirements except the new and changing color options.
Duration Of Freeze Thaw-Cycle (Seconds): 10
Render Post-Care Instructions In Note?: no
Duration Of Freeze Thaw-Cycle (Seconds): 7
Detail Level: Detailed
Number Of Freeze-Thaw Cycles: 2 freeze-thaw cycles
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

## 2020-11-12 ENCOUNTER — APPOINTMENT (RX ONLY)
Dept: URBAN - METROPOLITAN AREA CLINIC 140 | Facility: CLINIC | Age: 70
Setting detail: DERMATOLOGY
End: 2020-11-12

## 2020-11-12 DIAGNOSIS — Z85.828 PERSONAL HISTORY OF OTHER MALIGNANT NEOPLASM OF SKIN: ICD-10-CM

## 2020-11-12 DIAGNOSIS — D18.0 HEMANGIOMA: ICD-10-CM

## 2020-11-12 DIAGNOSIS — L82.1 OTHER SEBORRHEIC KERATOSIS: ICD-10-CM

## 2020-11-12 DIAGNOSIS — L57.0 ACTINIC KERATOSIS: ICD-10-CM

## 2020-11-12 DIAGNOSIS — L81.4 OTHER MELANIN HYPERPIGMENTATION: ICD-10-CM

## 2020-11-12 DIAGNOSIS — D22 MELANOCYTIC NEVI: ICD-10-CM

## 2020-11-12 PROBLEM — D18.01 HEMANGIOMA OF SKIN AND SUBCUTANEOUS TISSUE: Status: ACTIVE | Noted: 2020-11-12

## 2020-11-12 PROBLEM — D22.5 MELANOCYTIC NEVI OF TRUNK: Status: ACTIVE | Noted: 2020-11-12

## 2020-11-12 PROBLEM — Z92.3 PERSONAL HISTORY OF IRRADIATION: Status: ACTIVE | Noted: 2020-11-12

## 2020-11-12 PROCEDURE — ? COUNSELING

## 2020-11-12 PROCEDURE — 17004 DESTROY PREMAL LESIONS 15/>: CPT

## 2020-11-12 PROCEDURE — ? LIQUID NITROGEN

## 2020-11-12 PROCEDURE — 99214 OFFICE O/P EST MOD 30 MIN: CPT | Mod: 25

## 2020-11-12 ASSESSMENT — LOCATION DETAILED DESCRIPTION DERM
LOCATION DETAILED: LEFT LATERAL ZYGOMA
LOCATION DETAILED: RIGHT CENTRAL ZYGOMA
LOCATION DETAILED: RIGHT DORSAL WRIST
LOCATION DETAILED: RIGHT DISTAL RADIAL DORSAL FOREARM
LOCATION DETAILED: RIGHT INFERIOR MEDIAL MALAR CHEEK
LOCATION DETAILED: RIGHT LATERAL UPPER BACK
LOCATION DETAILED: LEFT INFERIOR LATERAL FOREHEAD
LOCATION DETAILED: RIGHT DISTAL DORSAL FOREARM
LOCATION DETAILED: LEFT DISTAL CALF
LOCATION DETAILED: LEFT DISTAL DORSAL FOREARM
LOCATION DETAILED: RIGHT SUPERIOR FOREHEAD
LOCATION DETAILED: LEFT CENTRAL MALAR CHEEK
LOCATION DETAILED: LEFT SUPERIOR FOREHEAD
LOCATION DETAILED: LEFT PROXIMAL DORSAL FOREARM
LOCATION DETAILED: PERIUMBILICAL SKIN
LOCATION DETAILED: STERNUM
LOCATION DETAILED: RIGHT RADIAL DORSAL HAND
LOCATION DETAILED: RIGHT PROXIMAL DORSAL FOREARM
LOCATION DETAILED: LEFT DISTAL ULNAR DORSAL FOREARM
LOCATION DETAILED: LEFT SUPERIOR LATERAL BUCCAL CHEEK
LOCATION DETAILED: RIGHT SUPERIOR LATERAL UPPER BACK
LOCATION DETAILED: RIGHT ULNAR DORSAL HAND

## 2020-11-12 ASSESSMENT — LOCATION SIMPLE DESCRIPTION DERM
LOCATION SIMPLE: ABDOMEN
LOCATION SIMPLE: LEFT CHEEK
LOCATION SIMPLE: CHEST
LOCATION SIMPLE: RIGHT ZYGOMA
LOCATION SIMPLE: RIGHT FOREHEAD
LOCATION SIMPLE: RIGHT HAND
LOCATION SIMPLE: LEFT FOREHEAD
LOCATION SIMPLE: RIGHT UPPER BACK
LOCATION SIMPLE: LEFT CALF
LOCATION SIMPLE: RIGHT WRIST
LOCATION SIMPLE: RIGHT CHEEK
LOCATION SIMPLE: LEFT FOREARM
LOCATION SIMPLE: RIGHT FOREARM
LOCATION SIMPLE: LEFT ZYGOMA

## 2020-11-12 ASSESSMENT — LOCATION ZONE DERM
LOCATION ZONE: TRUNK
LOCATION ZONE: HAND
LOCATION ZONE: LEG
LOCATION ZONE: ARM
LOCATION ZONE: FACE

## 2020-11-12 NOTE — PROCEDURE: LIQUID NITROGEN
Post-Care Instructions: I reviewed with the patient in detail post-care instructions. Patient is to wear sunprotection, and avoid picking at any of the treated lesions. Pt may apply Vaseline to crusted or scabbing areas.
Duration Of Freeze Thaw-Cycle (Seconds): 7
Render Post-Care Instructions In Note?: no
Number Of Freeze-Thaw Cycles: 2 freeze-thaw cycles
Detail Level: Detailed
Consent: The patient's consent was obtained including but not limited to risks of crusting, scabbing, blistering, scarring, darker or lighter pigmentary change, recurrence, incomplete removal and infection.

## 2020-11-12 NOTE — PROCEDURE: MIPS QUALITY
Quality 110: Preventive Care And Screening: Influenza Immunization: Influenza Immunization Ordered or Recommended, but not Administered due to system reason
Quality 154 Part B: Falls: Risk Screening (Should Be Reported With Measure 155.): Patient screened for future fall risk; documentation of no falls in the past year or only one fall without injury in the past year
Quality 47: Advance Care Plan: Advance Care Planning discussed and documented in the medical record; patient did not wish or was not able to name a surrogate decision maker or provide an advance care plan.
Quality 131: Pain Assessment And Follow-Up: Pain assessment using a standardized tool is documented as negative, no follow-up plan required
Quality 155 (Denominator): Falls Plan Of Care: Plan of Care not Documented, Reason not Otherwise Specified
Quality 111:Pneumonia Vaccination Status For Older Adults: Pneumococcal Vaccination not Administered or Previously Received, Reason not Otherwise Specified
Quality 154 Part A: Falls: Risk Assessment (Should Be Reported With Measure 155.): Falls risk assessment completed and documented in the past 12 months.
Detail Level: Detailed
Quality 431: Preventive Care And Screening: Unhealthy Alcohol Use - Screening: Patient screened for unhealthy alcohol use using a single question and scores less than 2 times per year
Quality 226: Preventive Care And Screening: Tobacco Use: Screening And Cessation Intervention: Patient screened for tobacco use and is an ex/non-smoker

## 2021-08-18 ENCOUNTER — CLINICAL SUPPORT (OUTPATIENT)
Dept: URGENT CARE | Facility: URGENT CARE | Age: 71
End: 2021-08-18
Payer: COMMERCIAL

## 2021-08-18 VITALS
DIASTOLIC BLOOD PRESSURE: 73 MMHG | OXYGEN SATURATION: 97 % | HEART RATE: 62 BPM | TEMPERATURE: 98.3 F | RESPIRATION RATE: 20 BRPM | WEIGHT: 270 LBS | HEIGHT: 73 IN | BODY MASS INDEX: 35.78 KG/M2 | SYSTOLIC BLOOD PRESSURE: 140 MMHG

## 2021-08-18 DIAGNOSIS — H61.23 IMPACTED CERUMEN OF BOTH EARS: Primary | ICD-10-CM

## 2021-08-18 PROCEDURE — 69209 REMOVE IMPACTED EAR WAX UNI: CPT | Mod: 50,PO

## 2021-08-18 ASSESSMENT — PAIN SCALES - GENERAL: PAINLEVEL: 0-NO PAIN

## 2021-08-18 NOTE — PROGRESS NOTES
Cerumen Removal    Date/Time: 8/18/2021 11:28 AM  Performed by: Stefanie Fritschie, RN      Consent  Verbal consent was obtained from the patient. Written consent was obtained from the patient. Risks, benefits, and alternatives were discussed. Consent given by patient. The patient states understanding of the procedure being performed. Patient ID confirmed verbally with the patient.       Procedure Details   Cerumenolytics not applied prior to procedure Cerumen was removed from bilateral ears with irrigation of hydrogen peroxide and warm water.        Post-Procedure   All Cerumen returned and canal(s) clear. Patient tolerated procedure well with no complications.

## 2021-11-16 ENCOUNTER — APPOINTMENT (RX ONLY)
Dept: URBAN - METROPOLITAN AREA CLINIC 140 | Facility: CLINIC | Age: 71
Setting detail: DERMATOLOGY
End: 2021-11-16

## 2021-11-16 DIAGNOSIS — D18.0 HEMANGIOMA: ICD-10-CM

## 2021-11-16 DIAGNOSIS — L82.1 OTHER SEBORRHEIC KERATOSIS: ICD-10-CM

## 2021-11-16 DIAGNOSIS — D22 MELANOCYTIC NEVI: ICD-10-CM

## 2021-11-16 DIAGNOSIS — L57.0 ACTINIC KERATOSIS: ICD-10-CM

## 2021-11-16 DIAGNOSIS — L82.0 INFLAMED SEBORRHEIC KERATOSIS: ICD-10-CM

## 2021-11-16 DIAGNOSIS — L81.4 OTHER MELANIN HYPERPIGMENTATION: ICD-10-CM

## 2021-11-16 DIAGNOSIS — D485 NEOPLASM OF UNCERTAIN BEHAVIOR OF SKIN: ICD-10-CM

## 2021-11-16 DIAGNOSIS — L57.8 OTHER SKIN CHANGES DUE TO CHRONIC EXPOSURE TO NONIONIZING RADIATION: ICD-10-CM

## 2021-11-16 PROBLEM — D22.5 MELANOCYTIC NEVI OF TRUNK: Status: ACTIVE | Noted: 2021-11-16

## 2021-11-16 PROBLEM — Z92.3 PERSONAL HISTORY OF IRRADIATION: Status: ACTIVE | Noted: 2021-11-16

## 2021-11-16 PROBLEM — D18.01 HEMANGIOMA OF SKIN AND SUBCUTANEOUS TISSUE: Status: ACTIVE | Noted: 2021-11-16

## 2021-11-16 PROBLEM — D48.5 NEOPLASM OF UNCERTAIN BEHAVIOR OF SKIN: Status: ACTIVE | Noted: 2021-11-16

## 2021-11-16 PROCEDURE — ? FOLLOW UP FOR NEXT VISIT

## 2021-11-16 PROCEDURE — ? COUNSELING

## 2021-11-16 PROCEDURE — 99213 OFFICE O/P EST LOW 20 MIN: CPT | Mod: 25

## 2021-11-16 PROCEDURE — 17004 DESTROY PREMAL LESIONS 15/>: CPT

## 2021-11-16 PROCEDURE — ? BIOPSY BY SHAVE METHOD

## 2021-11-16 PROCEDURE — 17110 DESTRUCTION B9 LES UP TO 14: CPT | Mod: 59

## 2021-11-16 PROCEDURE — 11102 TANGNTL BX SKIN SINGLE LES: CPT | Mod: 59

## 2021-11-16 PROCEDURE — 11103 TANGNTL BX SKIN EA SEP/ADDL: CPT | Mod: 59

## 2021-11-16 PROCEDURE — ? LIQUID NITROGEN

## 2021-11-16 ASSESSMENT — LOCATION DETAILED DESCRIPTION DERM
LOCATION DETAILED: LEFT INFERIOR LATERAL FOREHEAD
LOCATION DETAILED: RIGHT SUPERIOR LATERAL UPPER BACK
LOCATION DETAILED: PERIUMBILICAL SKIN
LOCATION DETAILED: RIGHT LATERAL DISTAL UPPER ARM
LOCATION DETAILED: RIGHT LATERAL ELBOW
LOCATION DETAILED: RIGHT LATERAL UPPER BACK
LOCATION DETAILED: RIGHT INFERIOR TEMPLE
LOCATION DETAILED: INFERIOR THORACIC SPINE
LOCATION DETAILED: RIGHT PROXIMAL RADIAL DORSAL FOREARM
LOCATION DETAILED: LEFT DISTAL RADIAL DORSAL FOREARM
LOCATION DETAILED: LEFT RADIAL DORSAL HAND
LOCATION DETAILED: RIGHT ULNAR DORSAL HAND
LOCATION DETAILED: RIGHT PROXIMAL DORSAL RING FINGER
LOCATION DETAILED: LEFT LATERAL SUPERIOR CHEST
LOCATION DETAILED: RIGHT DISTAL RADIAL DORSAL FOREARM
LOCATION DETAILED: LEFT PROXIMAL RADIAL DORSAL FOREARM
LOCATION DETAILED: RIGHT MID TEMPLE
LOCATION DETAILED: RIGHT PROXIMAL DORSAL FOREARM
LOCATION DETAILED: NASAL DORSUM
LOCATION DETAILED: LEFT LATERAL MALAR CHEEK
LOCATION DETAILED: RIGHT FOREHEAD
LOCATION DETAILED: RIGHT LATERAL EYEBROW
LOCATION DETAILED: LEFT DISTAL DORSAL FOREARM
LOCATION DETAILED: LEFT PROXIMAL DORSAL FOREARM
LOCATION DETAILED: LEFT INFERIOR LATERAL MALAR CHEEK
LOCATION DETAILED: LEFT MID TEMPLE
LOCATION DETAILED: LEFT VENTRAL LATERAL DISTAL FOREARM
LOCATION DETAILED: RIGHT DISTAL DORSAL FOREARM
LOCATION DETAILED: LEFT SUPERIOR CENTRAL MALAR CHEEK
LOCATION DETAILED: LEFT POSTERIOR SHOULDER
LOCATION DETAILED: RIGHT ANTERIOR SHOULDER

## 2021-11-16 ASSESSMENT — LOCATION SIMPLE DESCRIPTION DERM
LOCATION SIMPLE: CHEST
LOCATION SIMPLE: NOSE
LOCATION SIMPLE: UPPER BACK
LOCATION SIMPLE: ABDOMEN
LOCATION SIMPLE: RIGHT UPPER BACK
LOCATION SIMPLE: LEFT CHEEK
LOCATION SIMPLE: RIGHT UPPER ARM
LOCATION SIMPLE: LEFT TEMPLE
LOCATION SIMPLE: RIGHT EYEBROW
LOCATION SIMPLE: RIGHT SHOULDER
LOCATION SIMPLE: RIGHT HAND
LOCATION SIMPLE: RIGHT ELBOW
LOCATION SIMPLE: RIGHT FOREHEAD
LOCATION SIMPLE: LEFT FOREHEAD
LOCATION SIMPLE: LEFT FOREARM
LOCATION SIMPLE: LEFT HAND
LOCATION SIMPLE: RIGHT TEMPLE
LOCATION SIMPLE: RIGHT FOREARM
LOCATION SIMPLE: LEFT SHOULDER
LOCATION SIMPLE: RIGHT RING FINGER

## 2021-11-16 ASSESSMENT — LOCATION ZONE DERM
LOCATION ZONE: FACE
LOCATION ZONE: ARM
LOCATION ZONE: HAND
LOCATION ZONE: TRUNK
LOCATION ZONE: FINGER
LOCATION ZONE: NOSE

## 2021-11-16 NOTE — PROCEDURE: MIPS QUALITY
Quality 131: Pain Assessment And Follow-Up: Pain assessment using a standardized tool is documented as negative, no follow-up plan required
Quality 110: Preventive Care And Screening: Influenza Immunization: Influenza Immunization Ordered or Recommended, but not Administered due to system reason
Quality 154 Part B: Falls: Risk Screening (Should Be Reported With Measure 155.): Patient screened for future fall risk; documentation of no falls in the past year or only one fall without injury in the past year
Quality 47: Advance Care Plan: Advance Care Planning discussed and documented in the medical record; patient did not wish or was not able to name a surrogate decision maker or provide an advance care plan.
Quality 155 (Denominator): Falls Plan Of Care: Plan of Care not Documented, Reason not Otherwise Specified
Quality 111:Pneumonia Vaccination Status For Older Adults: Pneumococcal Vaccination not Administered or Previously Received, Reason not Otherwise Specified
Quality 154 Part A: Falls: Risk Assessment (Should Be Reported With Measure 155.): Falls risk assessment completed and documented in the past 12 months.
Detail Level: Detailed
Quality 226: Preventive Care And Screening: Tobacco Use: Screening And Cessation Intervention: Patient screened for tobacco use and is an ex/non-smoker
Quality 431: Preventive Care And Screening: Unhealthy Alcohol Use - Screening: Patient screened for unhealthy alcohol use using a single question and scores less than 2 times per year

## 2021-11-16 NOTE — PROCEDURE: LIQUID NITROGEN
Show Applicator Variable?: Yes
Post-Care Instructions: I reviewed with the patient in detail post-care instructions. Patient is to wear sunprotection, and avoid picking at any of the treated lesions. Pt may apply Vaseline to crusted or scabbing areas.
Medical Necessity Clause: This procedure was medically necessary because the lesions that were treated were:
Add 52 Modifier (Optional): no
Duration Of Freeze Thaw-Cycle (Seconds): 5
Number Of Freeze-Thaw Cycles: 2 freeze-thaw cycles
Consent: The patient's consent was obtained including but not limited to risks of crusting, scabbing, blistering, scarring, darker or lighter pigmentary change, recurrence, incomplete removal and infection.
Detail Level: Detailed
Medical Necessity Information: It is in your best interest to select a reason for this procedure from the list below. All of these items fulfill various CMS LCD requirements except the new and changing color options.
Post-Care Instructions: I reviewed with the patient in detail post-care instructions. Patient is to wear sunprotection, and avoid picking at any of the treated lesions. Pt may apply Vaseline to crusted or scabbing areas. Patient instructed to return to clinic in 4-6 weeks if any treated lesions persist or recur.
Number Of Freeze-Thaw Cycles: 1 freeze-thaw cycle

## 2021-11-16 NOTE — PROCEDURE: FOLLOW UP FOR NEXT VISIT
Instructions (Optional): Shave bx if not resolved
Scheduled For Follow Up In (Optional): 4-6 weeks
Scheduled For Follow Up In (Optional): 6 weeks
Detail Level: Simple
Instructions (Optional): Shave bx if not resolved. Patient advised that lesions resistant to cryotherapy raises concern for possibility of skin cancer. Untreated skin cancers can lead to increased risk of functional and cosmetic morbidity and mortality from metastatic disease.

## 2021-11-16 NOTE — PROCEDURE: BIOPSY BY SHAVE METHOD
Type Of Destruction Used: Curettage
Validate Triangulation: No
Wound Care: Petrolatum
Additional Anesthesia Volume In Cc (Will Not Render If 0): 0
Electrodesiccation And Curettage Text: The wound bed was treated with electrodesiccation and curettage after the biopsy was performed.
Biopsy Type: H and E
Depth Of Biopsy: dermis
Was A Bandage Applied: Yes
Silver Nitrate Text: The wound bed was treated with silver nitrate after the biopsy was performed.
Cryotherapy Text: The wound bed was treated with cryotherapy after the biopsy was performed.
Anesthesia Volume In Cc: 0.5
Hemostasis: Electrocautery and Aluminum Chloride
Notification Instructions: Patient will be notified of biopsy results. However, patient instructed to call the office if not contacted within 2 weeks.
Billing Type: Third-Party Bill
Electrodesiccation Text: The wound bed was treated with electrodesiccation after the biopsy was performed.
Consent: Written consent was obtained and risks were reviewed including but not limited to scarring, infection, bleeding, scabbing, incomplete removal, nerve damage and allergy to anesthesia.
Curettage Text: The wound bed was treated with curettage after the biopsy was performed.
Anesthesia Type: 1% lidocaine with epinephrine
Dressing: bandage
Biopsy Method: double edge Personna blade
Detail Level: Detailed
Post-Care Instructions: I reviewed with the patient in detail post-care instructions. Patient is to keep the biopsy site dry overnight, and then apply vaseline twice daily until healed. Patient may apply hydrogen peroxide soaks to remove any crusting.
Information: Selecting Yes will display possible errors in your note based on the variables you have selected. This validation is only offered as a suggestion for you. PLEASE NOTE THAT THE VALIDATION TEXT WILL BE REMOVED WHEN YOU FINALIZE YOUR NOTE. IF YOU WANT TO FAX A PRELIMINARY NOTE YOU WILL NEED TO TOGGLE THIS TO 'NO' IF YOU DO NOT WANT IT IN YOUR FAXED NOTE.

## 2022-06-11 ENCOUNTER — HOSPITAL ENCOUNTER (INPATIENT)
Facility: HOSPITAL | Age: 72
LOS: 3 days | Discharge: HOME | DRG: 863 | End: 2022-06-14
Attending: EMERGENCY MEDICINE | Admitting: FAMILY MEDICINE
Payer: MEDICARE

## 2022-06-11 ENCOUNTER — APPOINTMENT (OUTPATIENT)
Dept: RADIOLOGY | Facility: HOSPITAL | Age: 72
DRG: 863 | End: 2022-06-11
Payer: MEDICARE

## 2022-06-11 DIAGNOSIS — L03.116 CELLULITIS OF LEFT FOOT: ICD-10-CM

## 2022-06-11 DIAGNOSIS — M14.60 CHARCOT'S JOINT: ICD-10-CM

## 2022-06-11 DIAGNOSIS — T81.49XA WOUND INFECTION AFTER SURGERY: Primary | ICD-10-CM

## 2022-06-11 PROBLEM — I10 ESSENTIAL HYPERTENSION: Status: ACTIVE | Noted: 2022-06-11

## 2022-06-11 PROBLEM — E78.2 MIXED HYPERLIPIDEMIA: Status: ACTIVE | Noted: 2022-06-11

## 2022-06-11 PROBLEM — D68.51 FACTOR V LEIDEN (CMS/HCC): Status: ACTIVE | Noted: 2022-06-11

## 2022-06-11 LAB
ALBUMIN SERPL-MCNC: 4.1 G/DL (ref 3.5–5.3)
ALP SERPL-CCNC: 77 U/L (ref 45–115)
ALT SERPL-CCNC: 13 U/L (ref 7–52)
ANION GAP SERPL CALC-SCNC: 8 MMOL/L (ref 3–11)
AST SERPL-CCNC: 14 U/L
BASOPHILS # BLD AUTO: 0.1 10*3/UL
BASOPHILS NFR BLD AUTO: 1 % (ref 0–2)
BILIRUB SERPL-MCNC: 0.46 MG/DL (ref 0.2–1.4)
BUN SERPL-MCNC: 16 MG/DL (ref 7–25)
CALCIUM ALBUM COR SERPL-MCNC: 9.2 MG/DL (ref 8.6–10.3)
CALCIUM SERPL-MCNC: 9.3 MG/DL (ref 8.6–10.3)
CHLORIDE SERPL-SCNC: 99 MMOL/L (ref 98–107)
CO2 SERPL-SCNC: 27 MMOL/L (ref 21–32)
CREAT SERPL-MCNC: 1.06 MG/DL (ref 0.7–1.3)
CRP SERPL HS-MCNC: 175.6 MG/L
EOSINOPHIL # BLD AUTO: 0.3 10*3/UL
EOSINOPHIL NFR BLD AUTO: 3 % (ref 0–3)
ERYTHROCYTE [DISTWIDTH] IN BLOOD BY AUTOMATED COUNT: 15.1 % (ref 11.5–15)
ERYTHROCYTE [SEDIMENTATION RATE] IN BLOOD: 108 MM/HR
GFR SERPL CREATININE-BSD FRML MDRD: 75 ML/MIN/1.73M*2
GLUCOSE SERPL-MCNC: 95 MG/DL (ref 70–105)
HCT VFR BLD AUTO: 32.1 % (ref 38–50)
HGB BLD-MCNC: 10.9 G/DL (ref 13.2–17.2)
LACTATE BLDV-SCNC: 0.61 MMOL/L (ref 0.5–1.99)
LYMPHOCYTES # BLD AUTO: 1.5 10*3/UL
LYMPHOCYTES NFR BLD AUTO: 14 % (ref 15–47)
MCH RBC QN AUTO: 28.9 PG (ref 29–34)
MCHC RBC AUTO-ENTMCNC: 33.9 G/DL (ref 32–36)
MCV RBC AUTO: 85.3 FL (ref 82–97)
MONOCYTES # BLD AUTO: 1.1 10*3/UL
MONOCYTES NFR BLD AUTO: 10 % (ref 5–13)
MRSA DNA SPEC QL NAA+PROBE: NEGATIVE
NEUTROPHILS # BLD AUTO: 7.7 10*3/UL
NEUTROPHILS NFR BLD AUTO: 72 % (ref 46–70)
PLATELET # BLD AUTO: 405 10*3/UL (ref 130–350)
PMV BLD AUTO: 6.7 FL (ref 6.9–10.8)
POTASSIUM SERPL-SCNC: 4.2 MMOL/L (ref 3.5–5.1)
PROCALCITONIN SERPL-MCNC: 0.09 NG/ML
PROT SERPL-MCNC: 7.7 G/DL (ref 6–8.3)
RBC # BLD AUTO: 3.77 10*6/ΜL (ref 4.1–5.8)
SODIUM SERPL-SCNC: 134 MMOL/L (ref 135–145)
WBC # BLD AUTO: 10.7 10*3/UL (ref 3.7–9.6)

## 2022-06-11 PROCEDURE — 6360000200 HC RX 636 W HCPCS (ALT 250 FOR IP): Performed by: EMERGENCY MEDICINE

## 2022-06-11 PROCEDURE — 6360000200 HC RX 636 W HCPCS (ALT 250 FOR IP): Performed by: FAMILY MEDICINE

## 2022-06-11 PROCEDURE — 6370000100 HC RX 637 (ALT 250 FOR IP): Performed by: FAMILY MEDICINE

## 2022-06-11 PROCEDURE — 99285 EMERGENCY DEPT VISIT HI MDM: CPT

## 2022-06-11 PROCEDURE — 99223 1ST HOSP IP/OBS HIGH 75: CPT | Mod: AI | Performed by: FAMILY MEDICINE

## 2022-06-11 PROCEDURE — 96365 THER/PROPH/DIAG IV INF INIT: CPT

## 2022-06-11 PROCEDURE — 96366 THER/PROPH/DIAG IV INF ADDON: CPT

## 2022-06-11 PROCEDURE — 86141 C-REACTIVE PROTEIN HS: CPT | Performed by: EMERGENCY MEDICINE

## 2022-06-11 PROCEDURE — (BLANK) HC ROOM SEMI PRIVATE

## 2022-06-11 PROCEDURE — 84145 PROCALCITONIN (PCT): CPT | Performed by: EMERGENCY MEDICINE

## 2022-06-11 PROCEDURE — 2580000300 HC RX 258: Performed by: EMERGENCY MEDICINE

## 2022-06-11 PROCEDURE — 80053 COMPREHEN METABOLIC PANEL: CPT | Performed by: EMERGENCY MEDICINE

## 2022-06-11 PROCEDURE — 73610 X-RAY EXAM OF ANKLE: CPT | Mod: LT

## 2022-06-11 PROCEDURE — 85025 COMPLETE CBC W/AUTO DIFF WBC: CPT | Performed by: EMERGENCY MEDICINE

## 2022-06-11 PROCEDURE — 85652 RBC SED RATE AUTOMATED: CPT | Performed by: EMERGENCY MEDICINE

## 2022-06-11 PROCEDURE — 83605 ASSAY OF LACTIC ACID: CPT | Performed by: EMERGENCY MEDICINE

## 2022-06-11 PROCEDURE — 2580000300 HC RX 258: Performed by: FAMILY MEDICINE

## 2022-06-11 PROCEDURE — 87641 MR-STAPH DNA AMP PROBE: CPT | Performed by: FAMILY MEDICINE

## 2022-06-11 PROCEDURE — 36415 COLL VENOUS BLD VENIPUNCTURE: CPT | Performed by: EMERGENCY MEDICINE

## 2022-06-11 PROCEDURE — 99284 EMERGENCY DEPT VISIT MOD MDM: CPT | Performed by: EMERGENCY MEDICINE

## 2022-06-11 RX ORDER — ALUMINUM HYDROXIDE, MAGNESIUM HYDROXIDE, AND SIMETHICONE 1200; 120; 1200 MG/30ML; MG/30ML; MG/30ML
30 SUSPENSION ORAL 3 TIMES DAILY PRN
Status: DISCONTINUED | OUTPATIENT
Start: 2022-06-11 | End: 2022-06-14 | Stop reason: HOSPADM

## 2022-06-11 RX ORDER — ACETAMINOPHEN 650 MG/1
650 SUPPOSITORY RECTAL EVERY 4 HOURS PRN
Status: DISCONTINUED | OUTPATIENT
Start: 2022-06-11 | End: 2022-06-14 | Stop reason: HOSPADM

## 2022-06-11 RX ORDER — PRAVASTATIN SODIUM 40 MG/1
20 TABLET ORAL NIGHTLY
Status: DISCONTINUED | OUTPATIENT
Start: 2022-06-11 | End: 2022-06-14 | Stop reason: HOSPADM

## 2022-06-11 RX ORDER — LANOLIN ALCOHOL/MO/W.PET/CERES
1000 CREAM (GRAM) TOPICAL DAILY
COMMUNITY

## 2022-06-11 RX ORDER — NAPROXEN SODIUM 220 MG
220 TABLET ORAL EVERY 12 HOURS PRN
COMMUNITY
End: 2022-06-22 | Stop reason: ALTCHOICE

## 2022-06-11 RX ORDER — ACETAMINOPHEN 325 MG/1
325-650 TABLET ORAL EVERY 4 HOURS PRN
Status: DISCONTINUED | OUTPATIENT
Start: 2022-06-11 | End: 2022-06-14 | Stop reason: HOSPADM

## 2022-06-11 RX ORDER — FERROUS SULFATE 325(65) MG
325 TABLET ORAL
COMMUNITY

## 2022-06-11 RX ORDER — SODIUM CHLORIDE 0.9 % (FLUSH) 0.9 %
3 SYRINGE (ML) INJECTION AS NEEDED
Status: DISCONTINUED | OUTPATIENT
Start: 2022-06-11 | End: 2022-06-14 | Stop reason: HOSPADM

## 2022-06-11 RX ORDER — ONDANSETRON 4 MG/1
4 TABLET, FILM COATED ORAL EVERY 6 HOURS PRN
Status: DISCONTINUED | OUTPATIENT
Start: 2022-06-11 | End: 2022-06-14 | Stop reason: HOSPADM

## 2022-06-11 RX ORDER — CEFAZOLIN SODIUM 1 G/3ML
1 INJECTION, POWDER, FOR SOLUTION INTRAMUSCULAR; INTRAVENOUS EVERY 8 HOURS
Status: CANCELLED | OUTPATIENT
Start: 2022-06-11

## 2022-06-11 RX ORDER — POTASSIUM CITRATE 10 MEQ/1
10 TABLET, EXTENDED RELEASE ORAL 2 TIMES DAILY
Status: DISCONTINUED | OUTPATIENT
Start: 2022-06-11 | End: 2022-06-14 | Stop reason: HOSPADM

## 2022-06-11 RX ORDER — ONDANSETRON HYDROCHLORIDE 2 MG/ML
4 INJECTION, SOLUTION INTRAVENOUS EVERY 6 HOURS PRN
Status: DISCONTINUED | OUTPATIENT
Start: 2022-06-11 | End: 2022-06-14 | Stop reason: HOSPADM

## 2022-06-11 RX ORDER — LISINOPRIL 20 MG/1
20 TABLET ORAL DAILY
Status: DISCONTINUED | OUTPATIENT
Start: 2022-06-12 | End: 2022-06-14 | Stop reason: HOSPADM

## 2022-06-11 RX ORDER — ASPIRIN 81 MG/1
81 TABLET ORAL DAILY
Status: DISCONTINUED | OUTPATIENT
Start: 2022-06-12 | End: 2022-06-14 | Stop reason: HOSPADM

## 2022-06-11 RX ORDER — POTASSIUM CITRATE 15 MEQ/1
15 TABLET, EXTENDED RELEASE ORAL 2 TIMES DAILY
Status: DISCONTINUED | OUTPATIENT
Start: 2022-06-11 | End: 2022-06-11 | Stop reason: SDUPTHER

## 2022-06-11 RX ORDER — POTASSIUM CITRATE 15 MEQ/1
1 TABLET, EXTENDED RELEASE ORAL 2 TIMES DAILY
Status: DISCONTINUED | OUTPATIENT
Start: 2022-06-12 | End: 2022-06-11 | Stop reason: SDUPTHER

## 2022-06-11 RX ADMIN — PRAVASTATIN SODIUM 20 MG: 40 TABLET ORAL at 23:30

## 2022-06-11 RX ADMIN — CEFTRIAXONE 2000 MG: 2 INJECTION, POWDER, FOR SOLUTION INTRAMUSCULAR; INTRAVENOUS at 19:16

## 2022-06-11 RX ADMIN — VANCOMYCIN HYDROCHLORIDE 1750 MG: 10 INJECTION, POWDER, LYOPHILIZED, FOR SOLUTION INTRAVENOUS at 15:33

## 2022-06-11 RX ADMIN — POTASSIUM CITRATE 10 MEQ: 10 TABLET ORAL at 23:30

## 2022-06-11 ASSESSMENT — ENCOUNTER SYMPTOMS
PALPITATIONS: 0
NUMBNESS: 0
SHORTNESS OF BREATH: 0
JOINT SWELLING: 1
SEIZURES: 0
APPETITE CHANGE: 0
DIZZINESS: 0
FATIGUE: 0
SORE THROAT: 0
BRUISES/BLEEDS EASILY: 0
VOMITING: 0
DYSURIA: 0
FEVER: 0
HEMATURIA: 0
NAUSEA: 0
CONFUSION: 0
COUGH: 0
CHILLS: 0
BACK PAIN: 0
COLOR CHANGE: 1
ARTHRALGIAS: 1
COLOR CHANGE: 0
ABDOMINAL PAIN: 0
EYE PAIN: 0
DIARRHEA: 0
WHEEZING: 0
RHINORRHEA: 0
FEVER: 1

## 2022-06-11 ASSESSMENT — ACTIVITIES OF DAILY LIVING (ADL)
ASSISTIVE_DEVICES: SHOWER CHAIR
ADEQUATE_TO_COMPLETE_ADL: YES

## 2022-06-11 NOTE — MEDICATION HISTORY SPECIALIST NOTES
[facility] ED-[id number]    I interviewed patient in his ED room and received last doses. I verified allergies. Patient gets him medications filled through [facility]. [facility] will call Monday to verify medications.    New medications:  Vitamin C  Vitamin D  Probiotic  Vitamin B12  Lutein  B-Complex  Aleve  Ferrous Sulfate

## 2022-06-11 NOTE — H&P
History and Physical     Patient:Vincenzo Snyder  YOB: 1950   MRN:[id number] PCP: Pcp No   Code Status: Full Service:  Hospitalist   Admitting Physician: Dr. George Prater Date of Admission: 6/11/2022     Chief complaint: Foot pain    Transferred from: Home  History of Present Illness:     Source: chart review, the patient and Dr. Bridges    Vincenzo Snyder is a 72 y.o. male with PMH of hypertension, hyperlipidemia, left foot hardware, factor V Leyden deficiency presents to ER with left foot pain, swelling and subjective fevers.  Patient states he had left foot surgery in [place] on 3/23/2022 secondary to bone spur after seeing Ortho.  Postoperatively ended up with an infection resulting into wound VAC.  States he walked too soon and the stitches came out leading to infection and wound VAC.  In May he moved to [place] to help out his friends since they have a campground.  Prior to moving here he was also seen by podiatry and was treated with steroids for 7 days and antibiotics stating that he was possibly viral infection per his wife's history.  On 6/8/2022 he was seen in [place] for left foot pain and swelling.  He was prescribed Bactrim which he has been taking faithfully.  Since then the swelling and redness has improved.  Wife reports he no longer has the redness in his left lower extremity.  Denies any other symptomology.    In ER Vitals: Temp 97.7, pulse 87, RR 18, -130s/80s, 95% RA  Imaging: Left ankle x-ray: Soft tissue swelling without displaced fractures or dislocation, hardware at the mid foot with severe degenerative changes  EKG: None  Labs: , , WBC 10.7, H/H 10.9/32.1, platelets 405, sed rate 108, POC lactate 0.6, procalcitonin 0.09  Txt in ER: Vancomycin      No past medical history on file.      No past surgical history on file.      No family history on file.       reports that he has never smoked. He has never used smokeless  tobacco.      Medications:     No current facility-administered medications for this encounter.    Current Outpatient Medications:   •  ascorbic acid (VITAMIN C ORAL), Take 1 tablet by mouth daily, Disp: , Rfl:   •  cholecalciferol, vitamin D3, (VITAMIN D3 ORAL), Take 1 tablet by mouth daily, Disp: , Rfl:   •  Lactobacillus acidophilus (PROBIOTIC ORAL), Take 1 capsule by mouth daily, Disp: , Rfl:   •  cyanocobalamin (vitamin B-12) 1,000 mcg tablet, Take 1,000 mcg by mouth daily, Disp: , Rfl:   •  LUTEIN ORAL, Take 1 tablet by mouth daily, Disp: , Rfl:   •  B complex-vitamin C-folic acid (NEPHROCAPS) 1 mg capsule capsule, Take 1 capsule by mouth daily, Disp: , Rfl:   •  ferrous sulfate 325 mg (65 mg iron) tablet, Take 325 mg by mouth daily with breakfast, Disp: , Rfl:   •  naproxen sodium (Aleve) 220 mg tablet, Take 220 mg by mouth every 12 (twelve) hours as needed for pain scale 1-3/10, Disp: , Rfl:   •  turmeric root extract 500 mg capsule, Take by mouth 2 times daily, Disp: , Rfl:   •  aspirin 81 mg EC tablet, Take 81 mg by mouth daily, Disp: , Rfl:   •  sulfamethoxazole-trimethoprim (BACTRIM DS,SEPTRA DS) 800-160 mg per tablet, Take 1 tablet by mouth 2 (two) times a day for 10 days, Disp: 20 tablet, Rfl: 0  •  lisinopriL (PRINIVIL,ZESTRIL) 20 mg tablet, Take 20 mg by mouth daily, Disp: , Rfl:   •  potassium citrate (UROCIT-K) 15 mEq CR tablet, Take 1 tablet by mouth 2 (two) times a day, Disp: , Rfl:   •  pravastatin (PRAVACHOL) 20 mg tablet, Take 20 mg by mouth daily, Disp: , Rfl:   •  Xarelto 20 mg tablet, Take 20 mg by mouth daily, Disp: , Rfl:     Allergies   Allergen Reactions   • Oxycodone    • Oxycodone-Acetaminophen Itching     TYLENOL IS OK         Review of Systems   Constitutional: Positive for fever (Subjective). Negative for appetite change and fatigue.   HENT: Negative for congestion and rhinorrhea.    Eyes: Negative for visual disturbance.   Respiratory: Negative for cough, shortness of breath and  wheezing.    Cardiovascular: Negative for chest pain and palpitations.   Gastrointestinal: Negative for abdominal pain, diarrhea, nausea and vomiting.   Genitourinary: Negative for dysuria and hematuria.   Musculoskeletal: Positive for arthralgias, gait problem and joint swelling (Left foot swelling pain).   Skin: Positive for color change.   Neurological: Negative for dizziness and numbness.   Hematological: Does not bruise/bleed easily.   Psychiatric/Behavioral: Negative for confusion.       Findings:     PHYSICAL EXAM:  Temp:  [36.5 °C (97.7 °F)] 36.5 °C (97.7 °F)  Heart Rate:  [63-78] 66  Resp:  [18] 18  SpO2:  [95 %-98 %] 97 %  BP: (101-146)/(43-86) 146/77      Physical Exam  Constitutional:       General: He is not in acute distress.     Appearance: Normal appearance. He is obese. He is not ill-appearing.   HENT:      Head: Normocephalic and atraumatic.      Right Ear: External ear normal.      Left Ear: External ear normal.      Nose: Nose normal. No congestion or rhinorrhea.      Mouth/Throat:      Mouth: Mucous membranes are moist.   Eyes:      General: No scleral icterus.        Right eye: No discharge.         Left eye: No discharge.      Extraocular Movements: Extraocular movements intact.      Conjunctiva/sclera: Conjunctivae normal.   Cardiovascular:      Rate and Rhythm: Normal rate and regular rhythm.      Pulses: Normal pulses.      Heart sounds: Normal heart sounds. No murmur heard.  Pulmonary:      Effort: Pulmonary effort is normal.      Breath sounds: Normal breath sounds. No wheezing, rhonchi or rales.   Abdominal:      General: Bowel sounds are normal. There is no distension.      Palpations: Abdomen is soft.      Tenderness: There is no abdominal tenderness. There is no guarding.      Comments: Obese abdomen   Musculoskeletal:         General: Swelling and tenderness (Left midfoot, swelling, erythema, warm to touch slightly extending posteriorly up to the ankle and above) present. No  deformity. Normal range of motion.      Cervical back: Normal range of motion.      Right lower leg: No edema.   Skin:     General: Skin is warm and dry.      Findings: Erythema (Left foot) present.   Neurological:      General: No focal deficit present.      Mental Status: He is alert and oriented to person, place, and time.   Psychiatric:         Mood and Affect: Mood normal.         Behavior: Behavior normal.                   ECG:   EKG:, none.     Labs:  CBC with Platelet:    Lab Results   Component Value Date    WBC 10.7 (H) 06/11/2022    HGB 10.9 (L) 06/11/2022    HCT 32.1 (L) 06/11/2022     (H) 06/11/2022    RBC 3.77 (L) 06/11/2022    MCV 85.3 06/11/2022    MCH 28.9 (L) 06/11/2022    MCHC 33.9 06/11/2022    RDW 15.1 (H) 06/11/2022    MPV 6.7 (L) 06/11/2022     Comp:   Lab Results   Component Value Date     (L) 06/11/2022    K 4.2 06/11/2022    CL 99 06/11/2022    CO2 27 06/11/2022    BUN 16 06/11/2022    CREATININE 1.06 06/11/2022    GLUCOSE 95 06/11/2022    CALCIUM 9.3 06/11/2022    PROT 7.7 06/11/2022    ALBUMIN 4.1 06/11/2022    AST 14 06/11/2022    ALT 13 06/11/2022    ALKPHOS 77 06/11/2022    BILITOT 0.46 06/11/2022       Lab Results   Component Value Date    POCLACTATE 0.61 06/11/2022     Lab Results   Component Value Date    PCT 0.09 06/11/2022     HSCRP : 170  Lab Results   Component Value Date    SEDRATE 108 (H) 06/11/2022       Imaging:  X-ray ankle 3 or more views left    Result Date: 6/11/2022  Exam:  DX ANKLE 3 OR MORE VW LEFT06/11/2022 Clinical History:  pain. Comparison:  None Findings: Soft tissue swelling without displaced fracture or dislocation. Fragmented plantar calcaneal enthesopathy. Achilles tendon enthesopathy Hardware in the midfoot where there is severe degenerative change. This is incompletely imaged.     Impression: 1.  Soft tissue swelling in the ankle without displaced fracture or dislocation. 1.  Hardware at the mid foot with severe degenerative  change.      Problem list:   Active Problems:    Cellulitis of left foot    Essential hypertension    Mixed hyperlipidemia    Factor V Leiden (CMS/HCC) ([facility])      Assessment & Plan:  1. Left foot cellulitis/osteomyelitis: Postop complications after having left bone spur surgery on 3/23/2022 in [place].  Complicated by wound infection leading to wound VAC.  Patient does have history of metal hardware in the foot.  Recently seen on outpatient and was prescribed Bactrim some improvement.  Patient states he is unable to bear any weight on the left lower extremity.  Imaging today showed soft tissue swelling in the ankle without displacement fracture or dislocation.  Work-up noted for elevated WBC, CRP and ESR.  Received vancomycin in ER.  Resume vancomycin, add Ancef and check MRSA screen.  Patient was evaluated by Ortho in ER.  Formal recommendations to follow.  No tentative plan for OR per discussion with ER provider.  Consider CT scan.  2. Essential hypertension: Lisinopril  3. Hyperlipidemia: Pravastatin  4. Factor V Leiden deficiency: Xarelto    DVT Prophylaxis: Xarelto    Code Status: Full   this was reviewed with the patient/family/caregiver at the time of admission.    Anticipated length of Stay: >2 midnight stay    Spent approximately 70 minutes in total admitting patient, >50% time spent face to face in discussion with patient, ER physician, reviewing chart/records, coordination of care, history gathering, physical exam, admitting orders and answering questions to patient's/family satisfaction.     A voice recognition program was used to aid in documentation of this record. Sometimes words are not printed exactly as they were spoken.  While efforts were made to carefully edit and correct any inaccuracies, some areas may be present; please take these into context.  Please contact the provider if areas are identified.    Electronically signed by George Prater on 6/11/22 at 5:57 PM

## 2022-06-11 NOTE — ED PROVIDER NOTES
"    HPI:  Chief Complaint   Patient presents with   • Foot Pain     Left foot pain, swelling/redness, pain with ambulation, reports \"operation in march with infection and referral to wound care\", recently given antibiotic 6/8/22       This is a 72-year-old male who comes to the emergency department today with swelling and pain in his left ankle and foot.  In March she had surgery by an orthopedic surgeon in [place] because of a bone spur on the bottom of his foot.  Shortly thereafter he ended up having a infection, had to have a wound VAC and was on antibiotics.  This is all healed up quite nicely and he was discharged from that orthopedic surgeon and allowed to travel.  He lives in [place] in the summer.  He reports that it started to swell up over the last couple of days and on the eighth he was given Bactrim by a physicians assistant in [place].  He reports his symptoms or not getting better and seem to be getting worse.  He has not had fever but feels chilled and nauseated.  He reports overall he feels quite sick.  The pain is all located in the foot, mostly on the lateral side.  There is erythema and swelling.  He reports the swelling and erythema are worse than his baseline.  He has been taking some pain medication which has helped.  He does not have any calf pain or thigh pain          HISTORY:  No past medical history on file.    No past surgical history on file.    No family history on file.    Social History     Tobacco Use   • Smoking status: Never Smoker   • Smokeless tobacco: Never Used         ROS:  Review of Systems   Constitutional: Negative for chills and fever.   HENT: Negative for ear pain and sore throat.    Eyes: Negative for pain and visual disturbance.   Respiratory: Negative for cough and shortness of breath.    Cardiovascular: Negative for chest pain and palpitations.   Gastrointestinal: Negative for abdominal pain and vomiting.   Genitourinary: Negative for dysuria and hematuria. "   Musculoskeletal: Positive for arthralgias and joint swelling. Negative for back pain.   Skin: Negative for color change and rash.   Neurological: Negative for seizures and syncope.   All other systems reviewed and are negative.      PE:  ED Triage Vitals   Temp Heart Rate Resp BP SpO2   06/11/22 1300 06/11/22 1300 06/11/22 1300 06/11/22 1300 06/11/22 1300   36.5 °C (97.7 °F) 78 18 138/80 97 %      Mean BP (mmHg) Temp Source Heart Rate Source Patient Position BP Location   06/11/22 1400 06/11/22 1300 -- -- --   103 Oral         FiO2 (%)       --                  Physical Exam  Vitals and nursing note reviewed.   Constitutional:       Appearance: He is well-developed.   HENT:      Head: Normocephalic and atraumatic.      Mouth/Throat:      Mouth: Mucous membranes are moist.   Eyes:      Extraocular Movements: Extraocular movements intact.      Conjunctiva/sclera: Conjunctivae normal.      Pupils: Pupils are equal, round, and reactive to light.   Cardiovascular:      Rate and Rhythm: Normal rate and regular rhythm.      Heart sounds: No murmur heard.  Pulmonary:      Effort: Pulmonary effort is normal. No respiratory distress.      Breath sounds: Normal breath sounds.   Abdominal:      Palpations: Abdomen is soft.      Tenderness: There is no abdominal tenderness.   Musculoskeletal:         General: Swelling and tenderness present.      Cervical back: Neck supple.      Comments: Surgical incisions on the medial left foot are clean dry and intact.  The foot is swollen, erythematous and warm to the touch.  He has no swelling of the calf.  Movement of the toes is intact.  Sensation is intact.   Skin:     General: Skin is warm and dry.      Capillary Refill: Capillary refill takes less than 2 seconds.   Neurological:      General: No focal deficit present.      Mental Status: He is alert and oriented to person, place, and time.   Psychiatric:         Mood and Affect: Mood normal.         Behavior: Behavior normal.          ED LABS:  Labs Reviewed   CBC WITH AUTO DIFFERENTIAL - Abnormal       Result Value    WBC 10.7 (*)     RBC 3.77 (*)     Hemoglobin 10.9 (*)     Hematocrit 32.1 (*)     MCV 85.3      MCH 28.9 (*)     MCHC 33.9      RDW 15.1 (*)     Platelets 405 (*)     MPV 6.7 (*)     Neutrophils% 72 (*)     Lymphocytes% 14 (*)     Monocytes% 10      Eosinophils% 3      Basophils% 1      ANC (auto diff) 7.70      Lymphocytes Absolute 1.50      Monocytes Absolute 1.10      Eosinophils Absolute 0.30      Basophils Absolute 0.10     COMPREHENSIVE METABOLIC PANEL - Abnormal    Sodium 134 (*)     Potassium 4.2      Chloride 99      CO2 27      Anion Gap 8      BUN 16      Creatinine 1.06      Glucose 95      Calcium 9.3      AST 14      ALT (SGPT) 13      Alkaline Phosphatase 77      Total Protein 7.7      Albumin 4.1      Total Bilirubin 0.46      Corrected Calcium 9.2      eGFR 75      Narrative:     Calculation based on the 2021 Chronic Kidney Disease Epidemiology Collaboration (CKD-EPI) equation refit without adjustment for race.   HIGH SENSITIVITY CRP - Abnormal    CRP, High Sensitivity 175.60 (*)     Narrative:     If CRP is greater than 10 mg/L, the patient should be evaluated for other non-cardiovascular origins.  Recommended Cardiac Risk Assessment Categories              Range (mg/L)         Risk Level              Less than 1.0        Low                        3.0 - 10.0           High     SEDIMENTATION RATE, AUTOMATED - Abnormal    Sed Rate 108 (*)    PROCALCITONIN - Normal    Procalcitonin 0.09      Narrative:     Low risk of severe sepsis and/or septic shock.  Concentrations <0.5 ng/mL do not exclude an infection.  It is recommended to retest PCT within 6-24 hours if any concentrations <2.0 ng/mL are obtained.  [employer] PCT is a registered trademark belonging to [employer]  For more information about the [employer] Procalcitonin assay, please see the lab resources menu or chart search [employer] Procalcitonin.    POCT LACTIC ACID (LACTATE) - Normal    POC Lactate 0.61           ED IMAGES:  X-ray ankle 3 or more views left   Final Result   Impression:   1.  Soft tissue swelling in the ankle without displaced fracture or dislocation.      1.  Hardware at the mid foot with severe degenerative change.          ED PROCEDURES:  Procedures    ED COURSE:          Sepsis Quality Bundle         MDM:  MDM  Number of Diagnoses or Management Options  Wound infection after surgery  Diagnosis management comments: This is a 72-year-old male who comes to the emergency department today complaining of pain and swelling in his left foot.  He initially had surgery in March due to a bone spur, but developed a wound infection and ended up having to have a revision surgery and a wound VAC as well as antibiotics for a long course.  He reports that this delayed his coming to [place] which was his usual summer plan.  He reports that in May he got the allCLEAR and was able to come back to [place].  Within a couple weeks of being here he started to have some redness and swelling and was started on Bactrim.  He reports now it is getting worse.  He is also had some chills but without fever.  He reports he feels generally unwell now.  CBC shows a leukocytosis, he has mild anemia with no comparison labs.  Platelet count is elevated.  CMP shows a mildly low sodium no other electrolyte abnormalities.  Lactic is within normal limits.  CRP and sed rate are elevated.  Procalcitonin is normal.  He has not needed any pain medication as he took a hydrocodone before getting here and that has relieved his pain.  He does not appear septic.  He is not hypotensive, tachycardic, or febrile.  However I do believe that this is a wound infection with hardware underneath of it and may need surgery.  I discussed the case with orthopedics.  They are recommending IV antibiotics and admission to the hospitalist service.  I discussed this with the hospitalist who is  agreeable to his admission.  He was started on antibiotics here in the emergency department and had no worsening of his condition.  At the time of his admission his vital signs are stable.    Final diagnoses:   [T81.49XA] Wound infection after surgery     6/11/2022  5:08 PM                 Prema Bridges MD  06/11/22 [id number]

## 2022-06-11 NOTE — CONSULTATION
PATIENT: Vincenzo Snyder  AGE/SEX: 72 y.o., male  MRN: [id number]  : 1950    Service Date: 2022     Report Title: Trauma Orthopedic Consult Note    Chief Complaint: Swollen and painful left foot, status post wound dehiscence and infection from an orthopedic surgery in 2021    History of Present Illness  Vincenzo nSyder is a 72 y.o. male who is being evaluated today for the chief complaint of swollen and painful left foot, status post wound dehiscence and infection from an orthopedic surgery in 2022.  Patient's surgery was performed in [place].  Orthopedic Trauma services were consulted for further evaluation and management after the patient was found to have left foot cellulitis. The patient states the pain began sometime during April when his initial surgical wound dehisced and became infected, which he has had problems since. They report the pain is localized to the left foot and ankle and radiates proximally. The patient describes pain as severe in nature and states that it is constant. They report pain is worse with weightbearing and movement and improved with immobilization and elevation.  Associated symptoms include inability to ambulate properly.  The patient lives at in [place] during the summer in [place] during the winter and ambulates without an assistive device at baseline.      Patient acknowledges a significant history of bilateral Charcot arthropathy and multiple surgeries.  He also acknowledges factor V Leyden and is currently taking lisinopril and a statin      Past Medical History  No past medical history on file.      Past Surgical History  No past surgical history on file.      Family Medical History  No family history on file.       Allergies to Medications  Allergies   Allergen Reactions   • Oxycodone    • Oxycodone-Acetaminophen Itching     TYLENOL IS OK         Current Medications  Current Facility-Administered Medications   Medication Dose Route  Frequency Provider Last Rate Last Admin   • vancomycin (VANCOCIN) 1,750 mg in sodium chloride 0.9 % 250 mL IVPB  15 mg/kg intravenous Once Prema Bridges .8 mL/hr at 06/11/22 1533 1,750 mg at 06/11/22 1533     Current Outpatient Medications   Medication Sig Dispense Refill   • ascorbic acid (VITAMIN C ORAL) Take 1 tablet by mouth daily     • cholecalciferol, vitamin D3, (VITAMIN D3 ORAL) Take 1 tablet by mouth daily     • Lactobacillus acidophilus (PROBIOTIC ORAL) Take 1 capsule by mouth daily     • cyanocobalamin (vitamin B-12) 1,000 mcg tablet Take 1,000 mcg by mouth daily     • LUTEIN ORAL Take 1 tablet by mouth daily     • B complex-vitamin C-folic acid (NEPHROCAPS) 1 mg capsule capsule Take 1 capsule by mouth daily     • ferrous sulfate 325 mg (65 mg iron) tablet Take 325 mg by mouth daily with breakfast     • naproxen sodium (Aleve) 220 mg tablet Take 220 mg by mouth every 12 (twelve) hours as needed for pain scale 1-3/10     • turmeric root extract 500 mg capsule Take by mouth 2 times daily     • aspirin 81 mg EC tablet Take 81 mg by mouth daily     • sulfamethoxazole-trimethoprim (BACTRIM DS,SEPTRA DS) 800-160 mg per tablet Take 1 tablet by mouth 2 (two) times a day for 10 days 20 tablet 0   • lisinopriL (PRINIVIL,ZESTRIL) 20 mg tablet Take 20 mg by mouth daily     • potassium citrate (UROCIT-K) 15 mEq CR tablet Take 1 tablet by mouth 2 (two) times a day     • pravastatin (PRAVACHOL) 20 mg tablet Take 20 mg by mouth daily     • Xarelto 20 mg tablet Take 20 mg by mouth daily           Social History  Social History     Occupational History   • Not on file   Tobacco Use   • Smoking status: Never Smoker   • Smokeless tobacco: Never Used   Substance and Sexual Activity   • Alcohol use: Not on file   • Drug use: Not on file   • Sexual activity: Not on file         Review of Systems   Constitutional:  denies fevers/chills  HEENT:  denies  trauma to head/face    Neck:  denies  neck pain  Respiratory:   denies shortness of breath.  denies history of COPD/lung disease  Cardiac:  denies chest pain.  reports history of hypertension, CAD or CHF  Derm:  denies lacerations or bruising  Neuro:  denies  numbness/tingling  Endocrine:  denies history of diabetes  Heme:  reports use of anticoagulation  Allergy: Reports drug allergies to Oxycodone  MSK: Reports pain to left foot and ankle      Other systems reviewed and negative except pertinent positives and negatives as noted in HPI.      Remaining past medical history, past surgical history, family history, social history, medications, allergies, review of systems reviewed as above and per the history and physical performed by Prema Bridges MD on 11 June 2022.      Imaging  I personally reviewed the following orthopedic images, as well as the final orthopedic radiographic reports.  Images Last X-ray ankle 3 or more views left  Narrative: Exam:   DX ANKLE 3 OR MORE VW LEFT06/11/2022    Clinical History:   pain.    Comparison:    None    Findings:    Soft tissue swelling without displaced fracture or dislocation. Fragmented plantar calcaneal enthesopathy. Achilles tendon enthesopathy    Hardware in the midfoot where there is severe degenerative change. This is incompletely imaged.  Impression: Impression:  1.  Soft tissue swelling in the ankle without displaced fracture or dislocation.    1.  Hardware at the mid foot with severe degenerative change.        Pertinent Laboratory Studies  CBC:   Lab Results   Component Value Date    WBC 10.7 (H) 06/11/2022    RBC 3.77 (L) 06/11/2022    HGB 10.9 (L) 06/11/2022    HCT 32.1 (L) 06/11/2022     (H) 06/11/2022     BMP:   Lab Results   Component Value Date    GLUCOSE 95 06/11/2022     (L) 06/11/2022    K 4.2 06/11/2022    CL 99 06/11/2022    CO2 27 06/11/2022    BUN 16 06/11/2022    CREATININE 1.06 06/11/2022    CALCIUM 9.3 06/11/2022     ESR:   Lab Results   Component Value Date    SEDRATE 108 (H) 06/11/2022  "      Vitals  /86   Pulse 70   Temp 36.5 °C (97.7 °F) (Oral)   Resp 18   Ht 1.854 m (6' 1\")   Wt 119.2 kg (262 lb 12.6 oz)   SpO2 97%   BMI 34.67 kg/m²   BMI: Body mass index is 34.67 kg/m².      Physical Exam  General: Alert  Psych: A&O x 3  HEENT: head normocephalic  Neck: good ROM of neck  Lungs: no respiratory distress  CV: skin warm and well perfused  MSK: Physical exam of the physical exam of the left foot reveals grossly obvious Charcot deformity.  There are multiple well-healed surgical incisions on the dorsum of the foot.  The most recent surgical incision on the medial aspect of the arch is well-healed excluding a small portion at the most proximal portion, which appears to have been a draining sinus from his initial postoperative infection.  This area currently has stable and intact eschar with no indication of drainage, some fluctuance upon palpation.  The foot is erythematous and edematous to 6 cm proximal of the malleoli.  The most distal gastroc musculature is slightly tender to palpation, no crepitus felt.  Gross motor and light touch is intact, capillary refills less than 2 seconds, skin is otherwise intact.      Assessment  • Primary Orthopedic Diagnosis:   Left foot cellulitis    • Other Relevant Diagnoses:    Bilateral Charcot arthropathy      Plan:  Vincenzo Snyder is a 72 y.o. male was evaluated today by Orthopedic Trauma services for swollen and painful left foot.  Radiographs show Charcot arthropathy with previous hardware and significant degenerative changes throughout the midfoot.  I discussed the patient with the on-call orthopedic surgeon, Derrick Krishnan MD and after reviewing the patient's physical exam and imaging, recommendation is for admittance with IV antibiotics per medicine service.  This was discussed with the patient and they are agreeable to proceeding with this plan.  Orthopedics will follow.    Nakul Bejarano, CNP    [facility] [place]   "   For outpatient orthopedic follow up appointments please call [facility] at [phone].    A voice recognition program was used to aid in documentation of this record. Sometimes words are not printed exactly as they were spoken.  While efforts were made to carefully edit and correct any inaccuracies, some errors may be present; please take these into context.  Please contact the provider's office if you have any questions or concerns.

## 2022-06-12 ENCOUNTER — ANCILLARY PROCEDURE (OUTPATIENT)
Dept: ULTRASOUND IMAGING | Facility: HOSPITAL | Age: 72
DRG: 863 | End: 2022-06-12
Payer: MEDICARE

## 2022-06-12 LAB
ALBUMIN SERPL-MCNC: 3.8 G/DL (ref 3.5–5.3)
ALP SERPL-CCNC: 71 U/L (ref 45–115)
ALT SERPL-CCNC: 12 U/L (ref 7–52)
ANION GAP SERPL CALC-SCNC: 9 MMOL/L (ref 3–11)
AST SERPL-CCNC: 14 U/L
BASOPHILS # BLD AUTO: 0.1 10*3/UL
BASOPHILS NFR BLD AUTO: 1 % (ref 0–2)
BILIRUB SERPL-MCNC: 0.43 MG/DL (ref 0.2–1.4)
BUN SERPL-MCNC: 13 MG/DL (ref 7–25)
CALCIUM ALBUM COR SERPL-MCNC: 9.2 MG/DL (ref 8.6–10.3)
CALCIUM SERPL-MCNC: 9 MG/DL (ref 8.6–10.3)
CHLORIDE SERPL-SCNC: 103 MMOL/L (ref 98–107)
CO2 SERPL-SCNC: 26 MMOL/L (ref 21–32)
CREAT SERPL-MCNC: 0.84 MG/DL (ref 0.7–1.3)
EOSINOPHIL # BLD AUTO: 0.4 10*3/UL
EOSINOPHIL NFR BLD AUTO: 5 % (ref 0–3)
ERYTHROCYTE [DISTWIDTH] IN BLOOD BY AUTOMATED COUNT: 14.7 % (ref 11.5–15)
GFR SERPL CREATININE-BSD FRML MDRD: 94 ML/MIN/1.73M*2
GLUCOSE SERPL-MCNC: 108 MG/DL (ref 70–105)
HCT VFR BLD AUTO: 31.8 % (ref 38–50)
HGB BLD-MCNC: 10.5 G/DL (ref 13.2–17.2)
LYMPHOCYTES # BLD AUTO: 1.1 10*3/UL
LYMPHOCYTES NFR BLD AUTO: 14 % (ref 15–47)
MCH RBC QN AUTO: 28.1 PG (ref 29–34)
MCHC RBC AUTO-ENTMCNC: 33 G/DL (ref 32–36)
MCV RBC AUTO: 85 FL (ref 82–97)
MONOCYTES # BLD AUTO: 0.8 10*3/UL
MONOCYTES NFR BLD AUTO: 11 % (ref 5–13)
NEUTROPHILS # BLD AUTO: 5.5 10*3/UL
NEUTROPHILS NFR BLD AUTO: 70 % (ref 46–70)
PLATELET # BLD AUTO: 409 10*3/UL (ref 130–350)
PMV BLD AUTO: 6.7 FL (ref 6.9–10.8)
POTASSIUM SERPL-SCNC: 4.4 MMOL/L (ref 3.5–5.1)
PROT SERPL-MCNC: 7.1 G/DL (ref 6–8.3)
RBC # BLD AUTO: 3.74 10*6/ΜL (ref 4.1–5.8)
SODIUM SERPL-SCNC: 138 MMOL/L (ref 135–145)
WBC # BLD AUTO: 7.9 10*3/UL (ref 3.7–9.6)

## 2022-06-12 PROCEDURE — 059Y3ZZ DRAINAGE OF UPPER VEIN, PERCUTANEOUS APPROACH: ICD-10-PCS | Performed by: HOSPITALIST

## 2022-06-12 PROCEDURE — 80053 COMPREHEN METABOLIC PANEL: CPT | Performed by: FAMILY MEDICINE

## 2022-06-12 PROCEDURE — 76937 US GUIDE VASCULAR ACCESS: CPT

## 2022-06-12 PROCEDURE — 6360000200 HC RX 636 W HCPCS (ALT 250 FOR IP): Performed by: HOSPITALIST

## 2022-06-12 PROCEDURE — 6370000100 HC RX 637 (ALT 250 FOR IP): Performed by: FAMILY MEDICINE

## 2022-06-12 PROCEDURE — C1751 CATH, INF, PER/CENT/MIDLINE: HCPCS

## 2022-06-12 PROCEDURE — 2580000300 HC RX 258: Performed by: FAMILY MEDICINE

## 2022-06-12 PROCEDURE — 36415 COLL VENOUS BLD VENIPUNCTURE: CPT | Performed by: FAMILY MEDICINE

## 2022-06-12 PROCEDURE — 85025 COMPLETE CBC W/AUTO DIFF WBC: CPT | Performed by: FAMILY MEDICINE

## 2022-06-12 PROCEDURE — 36410 VNPNXR 3YR/> PHY/QHP DX/THER: CPT

## 2022-06-12 PROCEDURE — 6360000200 HC RX 636 W HCPCS (ALT 250 FOR IP): Performed by: FAMILY MEDICINE

## 2022-06-12 PROCEDURE — 99232 SBSQ HOSP IP/OBS MODERATE 35: CPT | Performed by: HOSPITALIST

## 2022-06-12 PROCEDURE — (BLANK) HC ROOM SEMI PRIVATE

## 2022-06-12 PROCEDURE — 2580000300 HC RX 258: Performed by: HOSPITALIST

## 2022-06-12 RX ORDER — SODIUM CHLORIDE 0.9 % (FLUSH) 0.9 %
10 SYRINGE (ML) INJECTION AS NEEDED
Status: DISCONTINUED | OUTPATIENT
Start: 2022-06-12 | End: 2022-06-14 | Stop reason: HOSPADM

## 2022-06-12 RX ADMIN — ASPIRIN 81 MG: 81 TABLET ORAL at 09:24

## 2022-06-12 RX ADMIN — VANCOMYCIN HYDROCHLORIDE 1250 MG: 1.25 INJECTION, POWDER, LYOPHILIZED, FOR SOLUTION INTRAVENOUS at 00:03

## 2022-06-12 RX ADMIN — AMPICILLIN SODIUM AND SULBACTAM SODIUM 3000 MG: 2; 1 INJECTION, POWDER, FOR SOLUTION INTRAMUSCULAR; INTRAVENOUS at 09:25

## 2022-06-12 RX ADMIN — RIVAROXABAN 20 MG: 20 TABLET, FILM COATED ORAL at 18:43

## 2022-06-12 RX ADMIN — POTASSIUM CITRATE 10 MEQ: 10 TABLET ORAL at 20:11

## 2022-06-12 RX ADMIN — ACETAMINOPHEN 650 MG: 325 TABLET ORAL at 20:30

## 2022-06-12 RX ADMIN — AMPICILLIN SODIUM AND SULBACTAM SODIUM 3000 MG: 2; 1 INJECTION, POWDER, FOR SOLUTION INTRAMUSCULAR; INTRAVENOUS at 14:18

## 2022-06-12 RX ADMIN — AMPICILLIN SODIUM AND SULBACTAM SODIUM 3000 MG: 2; 1 INJECTION, POWDER, FOR SOLUTION INTRAMUSCULAR; INTRAVENOUS at 20:22

## 2022-06-12 RX ADMIN — LISINOPRIL 20 MG: 20 TABLET ORAL at 09:24

## 2022-06-12 RX ADMIN — PRAVASTATIN SODIUM 20 MG: 40 TABLET ORAL at 20:11

## 2022-06-12 RX ADMIN — POTASSIUM CITRATE 10 MEQ: 10 TABLET ORAL at 09:35

## 2022-06-12 NOTE — H&P
[address] [address]  Daily Progress Note  Patient name: Vincenzo Snyder  MRN: [id number]   LOS: 1 day     Subjective   Patient says redness and swelling in left foot better today  Objective   Vitals:Temp:  [36.2 °C (97.1 °F)-36.5 °C (97.7 °F)] 36.5 °C (97.7 °F)  Heart Rate:  [63-78] 69  Resp:  [18-20] 20  SpO2:  [95 %-99 %] 99 %  BP: (101-146)/(43-86) 127/69  Physical Exam:   HEENT: Pupils equal round and reactive to light and accommodation.  Extraocular movements are intact.  Oropharynx clear with no erythema or exudate.  Neck: Supple nontender without palpable lymphadenopathy JVD bruits or goiter appreciated  Lungs: Clear to auscultation bilaterally  Heart: Regular rate and rhythm with normal S1 and S2  Abdomen: Soft obese nontender. normoactive bowel sounds. no hepatosplenomegaly  Extremities: Left foot with decreased erythema and swelling, bilateral soles of his feet with dirty callus with him walking outside barefoot.  Neurologic: Alert and oriented ×3.  CN II through XII intact.  Nonfocal     Results from last 4 days   Lab Units 06/12/22  0500 06/11/22  1400   WBC AUTO 10*3/uL 7.9 10.7*   HEMOGLOBIN g/dL 10.5* 10.9*   HEMATOCRIT % 31.8* 32.1*   PLATELETS AUTO 10*3/uL 409* 405*     Results from last 4 days   Lab Units 06/12/22  0500 06/11/22  1400   SODIUM mmol/L 138 134*   POTASSIUM MMOL/L 4.4 4.2   CHLORIDE mmol/L 103 99   CO2 mmol/L 26 27   BUN mg/dL 13 16   CREATININE mg/dL 0.84 1.06   CALCIUM mg/dL 9.0 9.3   ALBUMIN g/dL 3.8 4.1   TOTAL PROTEIN g/dL 7.1 7.7   BILIRUBIN TOTAL mg/dL 0.43 0.46   ALK PHOS U/L 71 77   ALT U/L 12 13   AST U/L 14 14   GLUCOSE mg/dL 108* 95         Assessment/Plan     Acute left foot cellulitis, no open draining wound   H/o has been walking bare foot lately in  / [facility] with dirty callused soles of bilateral feet  Bone spur removed 3/23/2022 in [place] with subsequent postoperative infection after noncompliance with  nonweightbearing status and wound dehiscence, treated with wound VAC and 3 weeks or so with antibiotics  Dr. Krishnan with Ortho consulted, recommending continuing antibiotics with podiatry Monday  MRSA PCR negative  X-ray ankle 3 more views left 6 07/2022  Soft tissue swelling without displaced fracture or dislocation. Fragmented plantar calcaneal enthesopathy. Achilles tendon enthesopathy  Hardware in the midfoot where there is severe degenerative change. This is incompletely imaged  Essential hypertension  Hyperlipidemia  Obesity / BMI 34  Factor V Leiden deficiency on chronic anticoagulation with apixaban  DVT prophylaxis: Already on apixaban    Plan:    Change antibiotics to IV Unasyn, recommend no more walking outside with bare feet  Plan consult podiatry Monday  Follow blood pressure    Electronically signed by: Alex Duran Jr., MD  6/12/2022  12:57 PM

## 2022-06-12 NOTE — PLAN OF CARE
Problem: Pain - Adult  Goal: Verbalizes/displays adequate comfort level or baseline comfort level  Description: INTERVENTIONS:  1. Encourage patient to monitor pain and request interventions  2. Assess pain using the appropriate pain scale  3. Administer analgesics based on type and severity of pain and evaluate response  4. Educate/Implement non-pharmacological measures as appropriate and evaluate response  5. Consider cultural, developmental and social influences on pain and pain management  6. Notify Provider if interventions unsuccessful or patient reports new pain  Outcome: Progressing     Problem: Infection - Adult  Goal: Absence of infection during hospitalization  Description: INTERVENTIONS:  1. Assess and monitor for signs and symptoms of infection  2. Monitor lab/diagnostic results  3. Monitor all insertion sites/wounds/incisions  4. Monitor secretions for changes in amount and color  5. Administer medications as ordered  6. Educate and encourage patient and family to use good hand hygiene technique  7. Identify and educate in appropriate isolation precautions for identified infection/condition  Outcome: Progressing     Problem: Safety Adult  Goal: Patient will remain safe during hospitalization  Description: INTERVENTIONS    1. Assess patient for fall risk and implement interventions if needed  2. Use safe transport techniques  3. Assess patient using the appropriate Jacky skin assessment scale  4. Assess patient for risk of aspiration  5. Assess patient for risk of elopement  6. Assess patient for risk of suicide  Outcome: Progressing     Problem: Daily Care  Goal: Daily care needs are met  Description: INTERVENTIONS:   1. Assess and monitor skin integrity  2. Identify patients at risk for skin breakdown on admission and per policy  3. Assess and monitor ability to perform self care and identify potential discharge needs  4. Assess skin integrity/risk for skin breakdown  5. Assist patient with  activities of daily living as needed  6. Encourage independent activity per ability   7. Provide mouth care   8. Include patient/family/caregiver in decisions related to daily care   Outcome: Progressing     Problem: Knowledge Deficit  Goal: Patient/family/caregiver demonstrates understanding of disease process, treatment plan, medications, and discharge instructions  Description: INTERVENTIONS:   1. Complete learning assessment and assess knowledge base  2. Provide teaching at level of understanding   3. Provide teaching via preferred learning methods  Outcome: Progressing     Problem: Discharge Barriers  Goal: Patient's discharge needs are met  Description: INTERVENTIONS:  1. Assess patient for self-management skills  2. Encourage participation in management  3. Identify potential discharge barriers on admission and throughout hospital stay  4. Involve patient/family/caregiver in discharge planning process  5. Collaborate with case management/ for discharge needs  Outcome: Progressing     Problem: Safety Adult - Fall  Goal: Free from fall injury  Description: INTERVENTIONS:    Inpatient - Please reference Cares/Safety Flowsheet under [facility] Fall Risk for interventions.  Pediatrics - Please reference Peds Daily Cares/Safety Flowsheet under [facility] Pediatric Fall Assessment Fall Bundle for interventions  LD/OB - Please reference OB Shift Screening Flowsheet under OB Fall Risk for interventions.  Outcome: Progressing

## 2022-06-12 NOTE — PROGRESS NOTES
"Ortho    NAEO. No new complaints    /69 (BP Location: Left arm, Patient Position: Supine, Cuff Size: Regular Adult)   Pulse 69   Temp 36.5 °C (97.7 °F) (Temporal)   Resp 20   Ht 1.854 m (6' 1\")   Wt 117.7 kg (259 lb 7.7 oz)   SpO2 99%   BMI 34.23 kg/m²     WBC 7.9  No blood cultures were obtained.  Patient already started on Unasyn and received Vanco on admit    GEN WDWN NAD  RESP Unlabored  RLE: foot is soft.  Decreased sensation is baseline due to neuropathy. Charcot deformity. Lateral aspect of foot focal point of erythema. Erythema does not spread over the foot or to the ankle.TTP to erythematous area but not to the remainder of the foot or to the calf    Cellultis R foot  Cont Abx per IM   Patient seen with Dr Krishnan.  Pt hx of multiple surgeries,most recently in [place] 3/22.  Patient admits he was noncompliant and started walking on it too soon.   Will consult Podiatry for further specialized care.    Nan Villa PA-C  Orthopaedic Trauma  [facility] [place]                "

## 2022-06-12 NOTE — PLAN OF CARE
Problem: Pain - Adult  Goal: Verbalizes/displays adequate comfort level or baseline comfort level  Description: INTERVENTIONS:  1. Encourage patient to monitor pain and request interventions  2. Assess pain using the appropriate pain scale  3. Administer analgesics based on type and severity of pain and evaluate response  4. Educate/Implement non-pharmacological measures as appropriate and evaluate response  5. Consider cultural, developmental and social influences on pain and pain management  6. Notify Provider if interventions unsuccessful or patient reports new pain  Outcome: Progressing     Problem: Infection - Adult  Goal: Absence of infection during hospitalization  Description: INTERVENTIONS:  1. Assess and monitor for signs and symptoms of infection  2. Monitor lab/diagnostic results  3. Monitor all insertion sites/wounds/incisions  4. Monitor secretions for changes in amount and color  5. Administer medications as ordered  6. Educate and encourage patient and family to use good hand hygiene technique  7. Identify and educate in appropriate isolation precautions for identified infection/condition  Outcome: Progressing     Problem: Safety Adult  Goal: Patient will remain safe during hospitalization  Description: INTERVENTIONS    1. Assess patient for fall risk and implement interventions if needed  2. Use safe transport techniques  3. Assess patient using the appropriate Jacky skin assessment scale  4. Assess patient for risk of aspiration  5. Assess patient for risk of elopement  6. Assess patient for risk of suicide  Outcome: Progressing     Problem: Daily Care  Goal: Daily care needs are met  Description: INTERVENTIONS:   1. Assess and monitor skin integrity  2. Identify patients at risk for skin breakdown on admission and per policy  3. Assess and monitor ability to perform self care and identify potential discharge needs  4. Assess skin integrity/risk for skin breakdown  5. Assist patient with  activities of daily living as needed  6. Encourage independent activity per ability   7. Provide mouth care   8. Include patient/family/caregiver in decisions related to daily care   Outcome: Progressing     Problem: Knowledge Deficit  Goal: Patient/family/caregiver demonstrates understanding of disease process, treatment plan, medications, and discharge instructions  Description: INTERVENTIONS:   1. Complete learning assessment and assess knowledge base  2. Provide teaching at level of understanding   3. Provide teaching via preferred learning methods  Outcome: Progressing     Problem: Discharge Barriers  Goal: Patient's discharge needs are met  Description: INTERVENTIONS:  1. Assess patient for self-management skills  2. Encourage participation in management  3. Identify potential discharge barriers on admission and throughout hospital stay  4. Involve patient/family/caregiver in discharge planning process  5. Collaborate with case management/ for discharge needs  Outcome: Progressing     Problem: Safety Adult - Fall  Goal: Free from fall injury  Description: INTERVENTIONS:    Inpatient - Please reference Cares/Safety Flowsheet under [facility] Fall Risk for interventions.  Pediatrics - Please reference Peds Daily Cares/Safety Flowsheet under [facility] Pediatric Fall Assessment Fall Bundle for interventions  LD/OB - Please reference OB Shift Screening Flowsheet under OB Fall Risk for interventions.  Outcome: Progressing   Care plan and goals discussed with pt

## 2022-06-12 NOTE — PLAN OF CARE
Problem: Pain - Adult  Goal: Verbalizes/displays adequate comfort level or baseline comfort level  Description: INTERVENTIONS:  1. Encourage patient to monitor pain and request interventions  2. Assess pain using the appropriate pain scale  3. Administer analgesics based on type and severity of pain and evaluate response  4. Educate/Implement non-pharmacological measures as appropriate and evaluate response  5. Consider cultural, developmental and social influences on pain and pain management  6. Notify Provider if interventions unsuccessful or patient reports new pain  Outcome: Progressing     Problem: Infection - Adult  Goal: Absence of infection during hospitalization  Description: INTERVENTIONS:  1. Assess and monitor for signs and symptoms of infection  2. Monitor lab/diagnostic results  3. Monitor all insertion sites/wounds/incisions  4. Monitor secretions for changes in amount and color  5. Administer medications as ordered  6. Educate and encourage patient and family to use good hand hygiene technique  7. Identify and educate in appropriate isolation precautions for identified infection/condition  Outcome: Progressing     Problem: Safety Adult  Goal: Patient will remain safe during hospitalization  Description: INTERVENTIONS    1. Assess patient for fall risk and implement interventions if needed  2. Use safe transport techniques  3. Assess patient using the appropriate Jacky skin assessment scale  4. Assess patient for risk of aspiration  5. Assess patient for risk of elopement  6. Assess patient for risk of suicide  Outcome: Progressing     Problem: Daily Care  Goal: Daily care needs are met  Description: INTERVENTIONS:   1. Assess and monitor skin integrity  2. Identify patients at risk for skin breakdown on admission and per policy  3. Assess and monitor ability to perform self care and identify potential discharge needs  4. Assess skin integrity/risk for skin breakdown  5. Assist patient with  activities of daily living as needed  6. Encourage independent activity per ability   7. Provide mouth care   8. Include patient/family/caregiver in decisions related to daily care   Outcome: Progressing     Problem: Knowledge Deficit  Goal: Patient/family/caregiver demonstrates understanding of disease process, treatment plan, medications, and discharge instructions  Description: INTERVENTIONS:   1. Complete learning assessment and assess knowledge base  2. Provide teaching at level of understanding   3. Provide teaching via preferred learning methods  Outcome: Progressing     Problem: Discharge Barriers  Goal: Patient's discharge needs are met  Description: INTERVENTIONS:  1. Assess patient for self-management skills  2. Encourage participation in management  3. Identify potential discharge barriers on admission and throughout hospital stay  4. Involve patient/family/caregiver in discharge planning process  5. Collaborate with case management/ for discharge needs  Outcome: Progressing

## 2022-06-12 NOTE — PROGRESS NOTES
Vancomycin Initial Consult    Background:  71yo male admitted  with swelling and pain in his left ankle and foot. In March he had ortho surgery in [place] due to a bone spur. Shortly thereafter he had an infection and was placed on antibiotics. This healed up nicely and he was discharged from ortho and allowed to travel. He reports swelling over the last couple of days and was prescribed Bactrim for outpatient therapy. His symptoms did not improve so he presents to the ED. Vancomycin 1750mg IV x 1 administered in ED @ 1530 (~15mg/kg).  Pharmacy consulted to dose vancomycin for cellulitis.     Vancomycin day 1  Ceftriaxone day 1    Significant Past Medical History:  No past medical history on file.    Assessment:  Labs:  Creatinine   Date Value Ref Range Status   2022 1.06 0.70 - 1.30 mg/dL Final     BUN   Date Value Ref Range Status   2022 16 7 - 25 mg/dL Final     Estimated Creatinine Clearance: 87.7 mL/min (by C-G formula based on SCr of 1.06 mg/dL).    WBC   Date Value Ref Range Status   2022 10.7 (H) 3.7 - 9.6 10*3/uL Final     Platelets   Date Value Ref Range Status   2022 405 (H) 130 - 350 10*3/uL Final       Weight: 119.2 kg (262 lb 12.6 oz)  Vitals:    22 1730   BP:    Pulse: 66   Resp:    Temp:    SpO2: 97%     No intake/output data recorded.  Temp Readings from Last 3 Encounters:   22 36.5 °C (97.7 °F) (Oral)   22 36.8 °C (98.3 °F) (Temporal)   21 36.8 °C (98.3 °F) (Temporal)     Baseline culture/source/susceptibility:    MRSA nasal PCR: pending    Imagin/10 Left ankle xray: Soft tissue swelling in the ankle without displaced fracture or dislocation; Hardware at the mid foot with severe degenerative change.     Left foot xray: Charcot foot with shortening of the first metatarsal and widening of the first cuneiform metatarsal joint. Osteomyelitis is not excluded.    Estimated Kinetics:  Vd ~ 74L  Ke ~ 0.07 h^-1  T1/2 ~  10h    Recommendations:  1. Vancomycin 1250mg IV q12h for a predicted trough ~14mcg/mL  2. Goal vancomycin trough level for cellulitis 10-15mcg/mL  3. Vancomycin trough level as needed    Thank you for the consult.  We will continue to monitor and adjust regimen as clinically appropriate.    Nakul Desai, Maria ElenaD

## 2022-06-13 LAB
ALBUMIN SERPL-MCNC: 3.7 G/DL (ref 3.5–5.3)
ANION GAP SERPL CALC-SCNC: 9 MMOL/L (ref 3–11)
BASOPHILS # BLD AUTO: 0 10*3/UL
BASOPHILS NFR BLD AUTO: 1 % (ref 0–2)
BUN SERPL-MCNC: 12 MG/DL (ref 7–25)
CALCIUM ALBUM COR SERPL-MCNC: 9.2 MG/DL (ref 8.6–10.3)
CALCIUM SERPL-MCNC: 9 MG/DL (ref 8.6–10.3)
CHLORIDE SERPL-SCNC: 106 MMOL/L (ref 98–107)
CO2 SERPL-SCNC: 25 MMOL/L (ref 21–32)
CREAT SERPL-MCNC: 0.82 MG/DL (ref 0.7–1.3)
EOSINOPHIL # BLD AUTO: 0.3 10*3/UL
EOSINOPHIL NFR BLD AUTO: 5 % (ref 0–3)
ERYTHROCYTE [DISTWIDTH] IN BLOOD BY AUTOMATED COUNT: 14.7 % (ref 11.5–15)
GFR SERPL CREATININE-BSD FRML MDRD: 94 ML/MIN/1.73M*2
GLUCOSE SERPL-MCNC: 105 MG/DL (ref 70–105)
HCT VFR BLD AUTO: 31.1 % (ref 38–50)
HGB BLD-MCNC: 10.4 G/DL (ref 13.2–17.2)
LYMPHOCYTES # BLD AUTO: 1.2 10*3/UL
LYMPHOCYTES NFR BLD AUTO: 17 % (ref 15–47)
MAGNESIUM SERPL-MCNC: 2.3 MG/DL (ref 1.8–2.4)
MCH RBC QN AUTO: 28.5 PG (ref 29–34)
MCHC RBC AUTO-ENTMCNC: 33.3 G/DL (ref 32–36)
MCV RBC AUTO: 85.5 FL (ref 82–97)
MONOCYTES # BLD AUTO: 0.8 10*3/UL
MONOCYTES NFR BLD AUTO: 11 % (ref 5–13)
NEUTROPHILS # BLD AUTO: 4.8 10*3/UL
NEUTROPHILS NFR BLD AUTO: 67 % (ref 46–70)
PHOSPHATE SERPL-MCNC: 3.4 MG/DL (ref 2.5–4.9)
PLATELET # BLD AUTO: 400 10*3/UL (ref 130–350)
PMV BLD AUTO: 6.6 FL (ref 6.9–10.8)
POTASSIUM SERPL-SCNC: 4.3 MMOL/L (ref 3.5–5.1)
RBC # BLD AUTO: 3.64 10*6/ΜL (ref 4.1–5.8)
SODIUM SERPL-SCNC: 140 MMOL/L (ref 135–145)
WBC # BLD AUTO: 7.2 10*3/UL (ref 3.7–9.6)

## 2022-06-13 PROCEDURE — 83735 ASSAY OF MAGNESIUM: CPT | Performed by: HOSPITALIST

## 2022-06-13 PROCEDURE — 6370000100 HC RX 637 (ALT 250 FOR IP): Performed by: FAMILY MEDICINE

## 2022-06-13 PROCEDURE — 2580000300 HC RX 258: Performed by: HOSPITALIST

## 2022-06-13 PROCEDURE — (BLANK) HC ROOM SEMI PRIVATE

## 2022-06-13 PROCEDURE — 36415 COLL VENOUS BLD VENIPUNCTURE: CPT | Performed by: HOSPITALIST

## 2022-06-13 PROCEDURE — 6360000200 HC RX 636 W HCPCS (ALT 250 FOR IP): Performed by: HOSPITALIST

## 2022-06-13 PROCEDURE — 80069 RENAL FUNCTION PANEL: CPT | Performed by: HOSPITALIST

## 2022-06-13 PROCEDURE — 99232 SBSQ HOSP IP/OBS MODERATE 35: CPT | Performed by: HOSPITALIST

## 2022-06-13 PROCEDURE — 85025 COMPLETE CBC W/AUTO DIFF WBC: CPT | Performed by: HOSPITALIST

## 2022-06-13 RX ADMIN — ACETAMINOPHEN 650 MG: 325 TABLET ORAL at 20:57

## 2022-06-13 RX ADMIN — AMPICILLIN SODIUM AND SULBACTAM SODIUM 3000 MG: 2; 1 INJECTION, POWDER, FOR SOLUTION INTRAMUSCULAR; INTRAVENOUS at 20:55

## 2022-06-13 RX ADMIN — AMPICILLIN SODIUM AND SULBACTAM SODIUM 3000 MG: 2; 1 INJECTION, POWDER, FOR SOLUTION INTRAMUSCULAR; INTRAVENOUS at 02:34

## 2022-06-13 RX ADMIN — AMPICILLIN SODIUM AND SULBACTAM SODIUM 3000 MG: 2; 1 INJECTION, POWDER, FOR SOLUTION INTRAMUSCULAR; INTRAVENOUS at 09:26

## 2022-06-13 RX ADMIN — PRAVASTATIN SODIUM 20 MG: 40 TABLET ORAL at 20:48

## 2022-06-13 RX ADMIN — ASPIRIN 81 MG: 81 TABLET ORAL at 09:20

## 2022-06-13 RX ADMIN — RIVAROXABAN 20 MG: 20 TABLET, FILM COATED ORAL at 18:22

## 2022-06-13 RX ADMIN — AMPICILLIN SODIUM AND SULBACTAM SODIUM 3000 MG: 2; 1 INJECTION, POWDER, FOR SOLUTION INTRAMUSCULAR; INTRAVENOUS at 15:37

## 2022-06-13 RX ADMIN — POTASSIUM CITRATE 10 MEQ: 10 TABLET ORAL at 20:48

## 2022-06-13 RX ADMIN — POTASSIUM CITRATE 10 MEQ: 10 TABLET ORAL at 09:20

## 2022-06-13 RX ADMIN — LISINOPRIL 20 MG: 20 TABLET ORAL at 09:20

## 2022-06-13 NOTE — PLAN OF CARE
Problem: Pain - Adult  Goal: Verbalizes/displays adequate comfort level or baseline comfort level  Description: INTERVENTIONS:  1. Encourage patient to monitor pain and request interventions  2. Assess pain using the appropriate pain scale  3. Administer analgesics based on type and severity of pain and evaluate response  4. Educate/Implement non-pharmacological measures as appropriate and evaluate response  5. Consider cultural, developmental and social influences on pain and pain management  6. Notify Provider if interventions unsuccessful or patient reports new pain  Outcome: Progressing     Problem: Infection - Adult  Goal: Absence of infection during hospitalization  Description: INTERVENTIONS:  1. Assess and monitor for signs and symptoms of infection  2. Monitor lab/diagnostic results  3. Monitor all insertion sites/wounds/incisions  4. Monitor secretions for changes in amount and color  5. Administer medications as ordered  6. Educate and encourage patient and family to use good hand hygiene technique  7. Identify and educate in appropriate isolation precautions for identified infection/condition  Outcome: Progressing     Problem: Safety Adult  Goal: Patient will remain safe during hospitalization  Description: INTERVENTIONS    1. Assess patient for fall risk and implement interventions if needed  2. Use safe transport techniques  3. Assess patient using the appropriate Jacky skin assessment scale  4. Assess patient for risk of aspiration  5. Assess patient for risk of elopement  6. Assess patient for risk of suicide  Outcome: Progressing     Problem: Safety Adult - Fall  Goal: Free from fall injury  Description: INTERVENTIONS:    Inpatient - Please reference Cares/Safety Flowsheet under [facility] Fall Risk for interventions.  Pediatrics - Please reference Peds Daily Cares/Safety Flowsheet under [place] Pediatric Fall Assessment Fall Bundle for interventions  LD/OB - Please reference OB Shift Screening  Flowsheet under OB Fall Risk for interventions.  Outcome: Progressing

## 2022-06-13 NOTE — PROGRESS NOTES
[address] [address]  Daily Progress Note  Patient name: Vincenzo Snyder  MRN: [id number]   LOS: 2 days     Subjective   Patient with swelling and erythema decreasing in left ankle  Objective   Vitals:Temp:  [36.1 °C (96.9 °F)-36.4 °C (97.5 °F)] 36.2 °C (97.2 °F)  Heart Rate:  [65-90] 65  Resp:  [18-20] 19  SpO2:  [97 %] 97 %  BP: (127-155)/(70-80) 139/78  Physical Exam:   HEENT: Pupils equal round and reactive to light and accommodation.  Extraocular movements are intact.  Oropharynx clear with no erythema or exudate.  Neck: Supple nontender without palpable lymphadenopathy JVD bruits or goiter appreciated  Lungs: Clear to auscultation bilaterally  Heart: Regular rate and rhythm with normal S1 and S2  Abdomen: Soft nontender. normoactive bowel sounds. no hepatosplenomegaly  Extremities: Left lower extremity with decreased swelling and erythema  Neurologic: Alert and oriented ×3.  CN II through XII intact.  Nonfocal     Results from last 4 days   Lab Units 06/13/22  0528 06/12/22  0500 06/11/22  1400   WBC AUTO 10*3/uL 7.2 7.9 10.7*   HEMOGLOBIN g/dL 10.4* 10.5* 10.9*   HEMATOCRIT % 31.1* 31.8* 32.1*   PLATELETS AUTO 10*3/uL 400* 409* 405*     Results from last 4 days   Lab Units 06/13/22  0528 06/12/22  0500 06/11/22  1400   SODIUM mmol/L 140 138 134*   POTASSIUM MMOL/L 4.3 4.4 4.2   CHLORIDE mmol/L 106 103 99   CO2 mmol/L 25 26 27   BUN mg/dL 12 13 16   CREATININE mg/dL 0.82 0.84 1.06   CALCIUM mg/dL 9.0 9.0 9.3   MAGNESIUM mg/dL 2.3  --   --    PHOSPHORUS mg/dL 3.4  --   --    ALBUMIN g/dL 3.7 3.8 4.1   TOTAL PROTEIN g/dL  --  7.1 7.7   BILIRUBIN TOTAL mg/dL  --  0.43 0.46   ALK PHOS U/L  --  71 77   ALT U/L  --  12 13   AST U/L  --  14 14   GLUCOSE mg/dL 105 108* 95         Assessment/Plan   Acute left foot cellulitis, no open draining wound, Charcot joint   [facility]used soles of bilateral feet  Bone spur  removed 3/23/2022 in [place] with subsequent postoperative infection after noncompliance with nonweightbearing status and wound dehiscence, treated with wound VAC and 3 weeks or so with antibiotics  Dr. Krishnan with Ortho consulted, recommending continuing antibiotics with podiatry Monday  MRSA PCR negative  X-ray ankle 3 more views left 6 07/2022  Soft tissue swelling without displaced fracture or dislocation. Fragmented plantar calcaneal enthesopathy. Achilles tendon enthesopathy  Hardware in the midfoot where there is severe degenerative change. This is incompletely imaged  Essential hypertension  Hyperlipidemia  Obesity / BMI 34  Factor V Leiden deficiency on chronic anticoagulation with apixaban  DVT prophylaxis: Already on apixaban     Plan:     Day 2 IV Unasyn, recommend no more walking outside with bare feet  Cussed with Dr. Villalta in podiatry and recommend cam boot on discharge and follow-up with him in 2 weeks, possibly tomorrow  Follow blood pressure    Electronically signed by: Alex Duran Jr., MD  6/13/2022  1:12 PM

## 2022-06-13 NOTE — FAX COVER SHEET
Facility Name: Replaced by [facility]  Mailing address: [address] [place] [address], Zip: [place]   [address]      Attention: UR      From: Anni Rock RN  Phone Number: [phone]      Comments: NPI - [id number]                      Tax ID [id number]                       [facility] NPI [id number]                       [facility]  NPI [id number]       Auth/Cert Number: [id number]    Please, see clinical.  Please call, email, or fax days authorized/approved.     We do not fax clinical on weekends or holidays      Thank you!    Anni Rock RN  Utilization Review    MAKE A DIFFERENCE, every day.    [address] [address]  p:  [phone]    f: [phone]   e: [email or web address]  Starting January 17th, 2020 [facility]'s name changed to [facility]: Find out more at [email or web address]     This facsimile message is CONFIDENTIAL and may contain -privileged information and/or Protected Health Information (PHI) as defined in the federal Health Insurance Portability and Accountability Act, as amended.  This facsimile is  intended ONLY for the use of the individual or company named.  If the reader is NOT the intended recipient, or the employee or agent responsible to deliver it to the intended recipient, you are hereby notified that any dissemination, distribution, or copying of this communication is prohibited.  If you have received this communication in error, please immediately notify us by telephone so that we may arrange for the return of the original message.

## 2022-06-13 NOTE — MEDICATION HISTORY SPECIALIST NOTES
[facility] [phone]    Follow up. Called Pharmacy to verify medication(s).  EPIC updated accordingly.    [facility] mail order (now called [facility])    Xarelto - Last fill 4/7 for 90ds  Potassium-  Last fill 2/13 for 90ds  Pravastatin- Last fill 6/10 for 90ds  Lisinopril - Last fill 6/10 for 90ds

## 2022-06-13 NOTE — INTERDISCIPLINARY/THERAPY
Case Management Admission Note    Phone # [phone]    Living Situation: Spouse/significant other Private residence ([employer] [place] campground)  [facility]          ADLs: Independent  Stairs: Yes 3  HME/CPAP: None      Oxygen: No      Home Health:No     Current Resources: None      Diabetes/supplies: Do you have Diabetes?: No  PCP: DERRICK PINTO MD  Funding: [facility], also has [facility]  Pharmacy:[facility] - [place], [place] - [address]    Support Person: Primary Emergency Contact: Tiffanie Snyder, Mobile Phone: [phone], Relation: Spouse  Needs transportation assistance at DC: No     Discharge Needs/Barriers:  None identified at this time  Narrative: Met with pt at bedside, introduced self and explained CM role. Pt admitted for left foot infection, this is an ongoing problem that he just cannot get completely healed. Pt states that he and his wife live in [place] during winter months and come up here to the [facility] to help friends with their campground for the summer. Pt is agreeable to establishing a PCP in the area, Dr. Derrick Pinto in [facility] will accept. Expect DC tomorrow. CM will follow for DC needs and timing.  Dispo: INDIRA

## 2022-06-13 NOTE — NURSING END OF SHIFT
Nursing End of Shift Summary:    Patient: Vincenzo Snyder  MRN: [id number]  : 1950, Age: 72 y.o.    Location: [address] [place] [address]    Nursing Goals       Narrative Summary of Progress Toward Clinical Goals:  Pt Aox4. VSS. Slight pain to left foot controlled w/ prn tylenol. IV abx given per order. No overnight events.     Barriers to Goals/Nursing Concerns:  No    New Patient or Family Concerns/Issues:  No    Shift Summary:   Significant Events & Communications to Providers (last 12 hours)     Last 5 Values    No documentation.             Oxygen Usage (last 12 hours)     Last 5 Values     Row Name 22                   Oxygen Weaning Trial by Nursing    Is Patient on Room Air OR on the Same Amount of O2 as at Home? Yes                Mobility (last 12 hours)     Last 5 Values     Row Name 22 2335             Mobility    Activity -- Stand at bedside --      Level of Assistance Standby assist, set-up cues, supervision of patient - no hands on Standby assist, set-up cues, supervision of patient - no hands on Standby assist, set-up cues, supervision of patient - no hands on      Distance Ambulated (feet) -- 1 Feet --      Distance Ambulated (Meters Calculated) -- 0.3 Meters --      Patient Position Supine -- Supine      Turning Turns self Turns self --      Distance Ambulated (Meters Calculated)(Do Not Use) -- 0.3 Feet --                Urethral Catheter     Active Urethral Catheter     None            Active Lines     Active Central venous catheter / Peripherally inserted central catheter / Implantable Port / Hemodialysis catheter / Midline Catheter     Name Placement date Placement time Site Days    Midline Peripheral 22 Left Cephalic 22  1155  Cephalic  less than 1              Infusing Medications   Medication Dose Last Rate     PRN Medications   Medication Dose Last Admin   • sodium chloride  10 mL     • sodium chloride  3 mL     • acetaminophen  325-650 mg 650 mg at  06/12/22 2030   • acetaminophen  650 mg     • ondansetron  4 mg      Or   • ondansetron  4 mg     • alum-mag hydroxide-simeth  30 mL       _________________________  Per Esquivel RN  06/13/22 5:23 AM

## 2022-06-13 NOTE — PLAN OF CARE
Problem: Pain - Adult  Goal: Verbalizes/displays adequate comfort level or baseline comfort level  Description: INTERVENTIONS:  1. Encourage patient to monitor pain and request interventions  2. Assess pain using the appropriate pain scale  3. Administer analgesics based on type and severity of pain and evaluate response  4. Educate/Implement non-pharmacological measures as appropriate and evaluate response  5. Consider cultural, developmental and social influences on pain and pain management  6. Notify Provider if interventions unsuccessful or patient reports new pain  Outcome: Progressing     Problem: Infection - Adult  Goal: Absence of infection during hospitalization  Description: INTERVENTIONS:  1. Assess and monitor for signs and symptoms of infection  2. Monitor lab/diagnostic results  3. Monitor all insertion sites/wounds/incisions  4. Monitor secretions for changes in amount and color  5. Administer medications as ordered  6. Educate and encourage patient and family to use good hand hygiene technique  7. Identify and educate in appropriate isolation precautions for identified infection/condition  Outcome: Progressing     Problem: Safety Adult  Goal: Patient will remain safe during hospitalization  Description: INTERVENTIONS    1. Assess patient for fall risk and implement interventions if needed  2. Use safe transport techniques  3. Assess patient using the appropriate Jacky skin assessment scale  4. Assess patient for risk of aspiration  5. Assess patient for risk of elopement  6. Assess patient for risk of suicide  Outcome: Progressing     Problem: Daily Care  Goal: Daily care needs are met  Description: INTERVENTIONS:   1. Assess and monitor skin integrity  2. Identify patients at risk for skin breakdown on admission and per policy  3. Assess and monitor ability to perform self care and identify potential discharge needs  4. Assess skin integrity/risk for skin breakdown  5. Assist patient with  activities of daily living as needed  6. Encourage independent activity per ability   7. Provide mouth care   8. Include patient/family/caregiver in decisions related to daily care   Outcome: Progressing     Problem: Knowledge Deficit  Goal: Patient/family/caregiver demonstrates understanding of disease process, treatment plan, medications, and discharge instructions  Description: INTERVENTIONS:   1. Complete learning assessment and assess knowledge base  2. Provide teaching at level of understanding   3. Provide teaching via preferred learning methods  Outcome: Progressing     Problem: Discharge Barriers  Goal: Patient's discharge needs are met  Description: INTERVENTIONS:  1. Assess patient for self-management skills  2. Encourage participation in management  3. Identify potential discharge barriers on admission and throughout hospital stay  4. Involve patient/family/caregiver in discharge planning process  5. Collaborate with case management/ for discharge needs  Outcome: Progressing     Problem: Safety Adult - Fall  Goal: Free from fall injury  Description: INTERVENTIONS:    Inpatient - Please reference Cares/Safety Flowsheet under [facility] Fall Risk for interventions.  Pediatrics - Please reference Peds Daily Cares/Safety Flowsheet under [place] Pediatric Fall Assessment Fall Bundle for interventions  LD/OB - Please reference OB Shift Screening Flowsheet under OB Fall Risk for interventions.  Outcome: Progressing   Careplan and goals [id number] with pt

## 2022-06-14 VITALS
SYSTOLIC BLOOD PRESSURE: 155 MMHG | RESPIRATION RATE: 16 BRPM | BODY MASS INDEX: 33.95 KG/M2 | HEART RATE: 66 BPM | HEIGHT: 73 IN | TEMPERATURE: 98.5 F | DIASTOLIC BLOOD PRESSURE: 80 MMHG | OXYGEN SATURATION: 94 % | WEIGHT: 256.17 LBS

## 2022-06-14 PROCEDURE — 99239 HOSP IP/OBS DSCHRG MGMT >30: CPT | Performed by: HOSPITALIST

## 2022-06-14 PROCEDURE — 6370000100 HC RX 637 (ALT 250 FOR IP): Performed by: FAMILY MEDICINE

## 2022-06-14 PROCEDURE — 2580000300 HC RX 258: Performed by: HOSPITALIST

## 2022-06-14 PROCEDURE — 6360000200 HC RX 636 W HCPCS (ALT 250 FOR IP): Performed by: HOSPITALIST

## 2022-06-14 RX ORDER — LACTOBACILLUS RHAMNOSUS GG 15B CELL
1 CAPSULE, SPRINKLE ORAL 2 TIMES DAILY
Qty: 28 CAPSULE | Refills: 0 | Status: SHIPPED
Start: 2022-06-14 | End: 2022-06-22 | Stop reason: ALTCHOICE

## 2022-06-14 RX ORDER — AMOXICILLIN AND CLAVULANATE POTASSIUM 875; 125 MG/1; MG/1
1 TABLET, FILM COATED ORAL 2 TIMES DAILY
Qty: 28 TABLET | Refills: 0 | Status: SHIPPED
Start: 2022-06-14 | End: 2022-06-28

## 2022-06-14 RX ADMIN — ASPIRIN 81 MG: 81 TABLET ORAL at 09:33

## 2022-06-14 RX ADMIN — POTASSIUM CITRATE 10 MEQ: 10 TABLET ORAL at 09:33

## 2022-06-14 RX ADMIN — LISINOPRIL 20 MG: 20 TABLET ORAL at 09:33

## 2022-06-14 RX ADMIN — AMPICILLIN SODIUM AND SULBACTAM SODIUM 3000 MG: 2; 1 INJECTION, POWDER, FOR SOLUTION INTRAMUSCULAR; INTRAVENOUS at 02:07

## 2022-06-14 RX ADMIN — AMPICILLIN SODIUM AND SULBACTAM SODIUM 3000 MG: 2; 1 INJECTION, POWDER, FOR SOLUTION INTRAMUSCULAR; INTRAVENOUS at 09:30

## 2022-06-14 NOTE — DISCHARGE SUMMARY
[address] [place] [address]  Discharge Summary    Patient name: Vincenzo Snyder  MRN: [id number]    Admission Date: 6/11/2022       Discharge Date: 6/14/2022    Admitting Provider: George Prater MD  Discharge Provider: Alex Duran MD  Primary Care Physician at Discharge: HENRI PINTO [phone]     Discharge Disposition  01 - Home or Self-Care  Code Status at Discharge: Full Code    Outpatient Follow-Up  Dr. Villalta in Podiatry 2 weeks  Future Appointments   Date Time Provider Department Center   6/22/2022  1:30 PM Jessica Mcdonnell MD [id number]    6/28/2022 10:00 AM Hiro Villalta, DPM [facility]       Discharge Diagnosis    Acute left foot cellulitis, no open draining wound, Charcot joint   Acute soft tissue infection without osteomyelitis, however difficult to assess though with charcot degenerative joint  Treated with 3 days IV Unasyn and then switched to oral Augmentin on discharge for another 2 weeks  Cam boot given to patient to use at all times and up ambulating  Follow-up Dr. Villalta in podiatry clinic 2 weeks  H/o has been walking bare foot lately in  / [facility] with dirty callused soles of bilateral feet  Bone spur removed 3/23/2022 in [place] with subsequent postoperative infection after noncompliance with nonweightbearing status and wound dehiscence, treated with wound VAC and 3 weeks or so with antibiotics  Dr. Krishnan with Ortho consulted, recommending continuing antibiotics with podiatry Monday  MRSA PCR negative  X-ray ankle 3 more views left 6 07/2022  Soft tissue swelling without displaced fracture or dislocation. Fragmented plantar calcaneal enthesopathy. Achilles tendon enthesopathy  Hardware in the midfoot where there is severe degenerative change. This is incompletely imaged  Essential hypertension  Hyperlipidemia  Obesity / BMI 34  Factor V Leiden deficiency on chronic anticoagulation with apixaban       Discharge medication list        START taking these medications         Instructions   amoxicillin-pot clavulanate 875-125 mg per tablet  Commonly known as: AUGMENTIN   Take 1 tablet by mouth 2 (two) times a day for 14 days     Culturelle 15 billion cell capsule, sprinkle  Generic drug: Lactobacillus rhamnosus GG   Take 1 capsule by mouth 2 (two) times a day for 14 days            CONTINUE taking these medications        Instructions   Aleve 220 mg tablet  Generic drug: naproxen sodium      aspirin 81 mg EC tablet      B complex-vitamin C-folic acid 1 mg capsule capsule  Commonly known as: NEPHROCAPS      ferrous sulfate 325 mg (65 mg iron) tablet      lisinopriL 20 mg tablet  Commonly known as: PRINIVIL,ZESTRIL      LUTEIN ORAL      potassium citrate 15 mEq CR tablet  Commonly known as: UROCIT-K      pravastatin 20 mg tablet  Commonly known as: PRAVACHOL      PROBIOTIC ORAL      turmeric root extract 500 mg capsule      vitamin B-12 1,000 mcg tablet  Generic drug: cyanocobalamin      VITAMIN C ORAL      VITAMIN D3 ORAL      Xarelto 20 mg tablet  Generic drug: rivaroxaban             STOP taking these medications      sulfamethoxazole-trimethoprim 800-160 mg per tablet  Commonly known as: BACTRIM DS,SEPTRA DS               Where to Get Your Medications        These medications were sent to [facility] - [place] - [address]  [address] [place] [address]      Phone: [phone]   amoxicillin-pot clavulanate 875-125 mg per tablet  Culturelle 15 billion cell capsule, sprinkle         Hospital Course  Patient admitted to [facility] with acute left foot cellulitis and edema.  He did not have any open draining wound.  He does have a Charcot joint.  X-rays reveal soft tissue swelling without displaced fracture or dislocation and no signs of acute osteo however patient does have Charcot joint.  Patient initially had a bone spur removed 3/23/2022 in [place] with subsequent postoperative infection after noncompliance with nonweightbearing  "status, wound dehiscence, and treated with wound VAC and 3 weeks or so of antibiotics.  Patient treated with IV Unasyn for 3 days here with dramatic improvement in erythema almost completely resolved as well as decrease in his edema.  He was fitted for a cam boot and discharged home on another 2 weeks of oral Augmentin with an appointment with Dr. Villalta in podiatry in 2 weeks.    Physical Exam at Discharge   Discharge Condition: good  /80   Pulse 66   Temp 36.9 °C (98.5 °F) (Oral)   Resp 16   Ht 1.854 m (6' 0.99\")   Wt 116.2 kg (256 lb 2.8 oz)   SpO2 94%   BMI 33.81 kg/m²   Weight: 116.2 kg (256 lb 2.8 oz)  HEENT:  PERRLA. Extra ocular movements are intact. Oral pharynx clear without erythema of exudate.   Neck:  Supple. Nontender.  No palpable lymphadenopathy, JVD, or goiter.  Lungs: Clear to auscultation bilaterally without adventitious sounds appreciated  Heart: Regular rate and rhythm with normal S1, S2  Abdomen: Soft.  Nontender.  Normal active bowel sounds.  No hepatosplenomegaly or other masses appreciated.  Extremities: Minimal erythema and decreased edema to the leg.    Neurologic: Alert oriented ×3.  CN II through XII intact.  Nonfocal    Time spent coordinating discharge: 35 min    Electronically signed by: Alex Duran Jr., MD  6/14/2022  11:40 AM    "

## 2022-06-14 NOTE — INTERDISCIPLINARY/THERAPY
Case Management Discharge Note    Phone # [phone]    Discharge Disposition: HO     Needs transportation assistance at DC: No    Specialty Referrals: Podiatry, PCP         Active Ambulatory Referrals   (From admission, onward)             Start     Ordered    06/14/22 0000  Ambulatory referral to Podiatry        Comments: Left foot cellulitis  Charcot joint    06/14/22 0904                Support System Notified: by patient    Narrative: Pt denies any needs at time of discharge.

## 2022-06-14 NOTE — NURSING END OF SHIFT
Nursing End of Shift Summary:    Patient: Vincenzo Snyder  MRN: [id number]  : 1950, Age: 72 y.o.    Location: [address] [place] [address]    Nursing Goals   Patient to maintain pain score of 5/10 or less with use of PRN medications as needed.     Narrative Summary of Progress Toward Clinical Goals:  Patients vital signs stable overnight. No overnight events to report. Patient given PRN tylenol as needed for pain; with relief      Barriers to Goals/Nursing Concerns:  Orders      New Patient or Family Concerns/Issues:  Patient wants discharged today.    Shift Summary:   Significant Events & Communications to Providers (last 12 hours)     Last 5 Values    No documentation.             Oxygen Usage (last 12 hours)     Last 5 Values     Row Name 22 0245                Oxygen Weaning Trial by Nursing    Is Patient on Room Air OR on the Same Amount of O2 as at Home? Yes Yes               Mobility (last 12 hours)     Last 5 Values     Row Name 22 0005                Mobility    Level of Assistance Standby assist, set-up cues, supervision of patient - no hands on --       Patient Position -- Supine       Turning Turns self --                 Urethral Catheter     Active Urethral Catheter     None            Active Lines     Active Central venous catheter / Peripherally inserted central catheter / Implantable Port / Hemodialysis catheter / Midline Catheter     Name Placement date Placement time Site Days    Midline Peripheral 22 Left Cephalic 22  1155  Cephalic  1              Infusing Medications   Medication Dose Last Rate     PRN Medications   Medication Dose Last Admin   • sodium chloride  10 mL     • sodium chloride  3 mL     • acetaminophen  325-650 mg 650 mg at 22   • acetaminophen  650 mg     • ondansetron  4 mg      Or   • ondansetron  4 mg     • alum-mag hydroxide-simeth  30 mL       _________________________  Sariah Hurtado RN  22 7:29 AM

## 2022-06-28 ENCOUNTER — CONSULT (OUTPATIENT)
Dept: PODIATRY | Facility: CLINIC | Age: 72
End: 2022-06-28
Payer: MEDICARE

## 2022-06-28 VITALS
WEIGHT: 256 LBS | OXYGEN SATURATION: 98 % | RESPIRATION RATE: 18 BRPM | DIASTOLIC BLOOD PRESSURE: 71 MMHG | SYSTOLIC BLOOD PRESSURE: 136 MMHG | BODY MASS INDEX: 34.67 KG/M2 | HEIGHT: 72 IN | HEART RATE: 55 BPM

## 2022-06-28 DIAGNOSIS — M14.60 CHARCOT'S JOINT: ICD-10-CM

## 2022-06-28 DIAGNOSIS — L03.116 CELLULITIS OF LEFT FOOT: ICD-10-CM

## 2022-06-28 PROCEDURE — G0463 HOSPITAL OUTPT CLINIC VISIT: HCPCS | Performed by: PODIATRIST

## 2022-06-28 PROCEDURE — 99203 OFFICE O/P NEW LOW 30 MIN: CPT | Performed by: PODIATRIST

## 2022-06-28 ASSESSMENT — PAIN SCALES - GENERAL: PAINLEVEL: 0-NO PAIN

## 2022-06-28 NOTE — PROGRESS NOTES
Patient Id: Vincenzo Snyder is a 72 y.o. male who presents for follow up on Foot Problem (Cellulitus of left foot/Charcot foot; ED admit  6/11-6/14. Bone spur removd 3/23/22 in [place]. ([facility] left foot xray 6/8/22, [facility] left ankle xray 6/11/22))      Patient presents to the clinic today for Cellulitus of left foot/Charcot foot; ED admit  6/11-6/14. Bone spur removd 3/23/22 in [place]. ([facility] left foot xray 6/8/22, [facility] left ankle xray 6/11/22), that has been going on for about since about march 2022.  The pain is located no pain.  The patient has tried CAM boot, antibiotic (AMOX-CLAV 875-125mg), trying to stay off of the foot to help with this issue, which does seem to be helping.  The issue seems to be worse when walking a lot on the foot.  The patient's pain is currently 0/10.              HPI    Review of Systems     Vitals:  /71 (BP Location: Right arm, Patient Position: Sitting, Cuff Size: Long Adult)   Pulse 55   Resp 18   Ht 1.829 m (6')   Wt 116.1 kg (256 lb)   SpO2 98%   BMI 34.72 kg/m²        Physical Exam:  General: Patient is oriented to person, place and time.  Patient not in apparent distress at this time.  Head: Normal visual tracking.  Hearing intact to spoken word.  Normocephalic.  Lungs: Unlabored breathing.  No supplemental oxygen needed.  Psychiatric: Patient well oriented with normal appropriate affect.    Derm:   No open wounds.  Skin, turgor, texture and color within normal limits      Neuro:  Protective sensation is absent using light touch the bilateral feet.  Normal muscle tone with no evidence of spasticity or rigidity bilaterally. Negative ankle clonus bilaterally. Patient able to plantarflex and dorsiflex the ankle bilaterally.    MSK:  Charcot deformity to the left foot with valgus position of the rear foot and forefoot.  Decrease in ankle range of motion.      Problem List Items Addressed This Visit        Infectious Disease    Cellulitis of left foot      Other Visit Diagnoses   "   Charcot's joint              Plan:   -Today I did offer a prescription for custom extra-depth diabetic shoes with custom accommodative inserts.  At this time the patient declines.  He states he has had inserts in shoes before and they \"did nothing\".    X-rays of the foot and ankle reviewed.    -Patient may discontinue use of the cam walker boot and ambulate as tolerated in his new balance shoes.    At this time there is no erythema, edema involving the left lower extremity.  I do not think the patient had a Charcot event at that time I think he had cellulitis.    -Return as needed     Hiro Villalta, KEANU     A voice recognition program was used to aid in documentation of this record. Sometimes words are not printed exactly as they were spoken.  While efforts were made to carefully edit and correct any inaccuracies, some errors may be present; please take these into context.    "

## 2022-11-17 ENCOUNTER — APPOINTMENT (RX ONLY)
Dept: URBAN - METROPOLITAN AREA CLINIC 140 | Facility: CLINIC | Age: 72
Setting detail: DERMATOLOGY
End: 2022-11-17

## 2022-11-17 DIAGNOSIS — L82.1 OTHER SEBORRHEIC KERATOSIS: ICD-10-CM

## 2022-11-17 DIAGNOSIS — L57.8 OTHER SKIN CHANGES DUE TO CHRONIC EXPOSURE TO NONIONIZING RADIATION: ICD-10-CM

## 2022-11-17 DIAGNOSIS — D49.2 NEOPLASM OF UNSPECIFIED BEHAVIOR OF BONE, SOFT TISSUE, AND SKIN: ICD-10-CM

## 2022-11-17 DIAGNOSIS — D18.0 HEMANGIOMA: ICD-10-CM

## 2022-11-17 DIAGNOSIS — Z71.89 OTHER SPECIFIED COUNSELING: ICD-10-CM

## 2022-11-17 DIAGNOSIS — D22 MELANOCYTIC NEVI: ICD-10-CM

## 2022-11-17 DIAGNOSIS — Z85.828 PERSONAL HISTORY OF OTHER MALIGNANT NEOPLASM OF SKIN: ICD-10-CM

## 2022-11-17 DIAGNOSIS — L57.0 ACTINIC KERATOSIS: ICD-10-CM

## 2022-11-17 DIAGNOSIS — L81.4 OTHER MELANIN HYPERPIGMENTATION: ICD-10-CM

## 2022-11-17 PROBLEM — D18.01 HEMANGIOMA OF SKIN AND SUBCUTANEOUS TISSUE: Status: ACTIVE | Noted: 2022-11-17

## 2022-11-17 PROBLEM — D22.5 MELANOCYTIC NEVI OF TRUNK: Status: ACTIVE | Noted: 2022-11-17

## 2022-11-17 PROCEDURE — 17000 DESTRUCT PREMALG LESION: CPT | Mod: 59

## 2022-11-17 PROCEDURE — ? SUNSCREEN RECOMMENDATIONS

## 2022-11-17 PROCEDURE — ? BIOPSY BY SHAVE METHOD

## 2022-11-17 PROCEDURE — 99213 OFFICE O/P EST LOW 20 MIN: CPT | Mod: 25

## 2022-11-17 PROCEDURE — 11102 TANGNTL BX SKIN SINGLE LES: CPT

## 2022-11-17 PROCEDURE — 11103 TANGNTL BX SKIN EA SEP/ADDL: CPT

## 2022-11-17 PROCEDURE — 17003 DESTRUCT PREMALG LES 2-14: CPT

## 2022-11-17 PROCEDURE — ? COUNSELING

## 2022-11-17 PROCEDURE — ? LIQUID NITROGEN

## 2022-11-17 ASSESSMENT — LOCATION ZONE DERM
LOCATION ZONE: SCALP
LOCATION ZONE: TRUNK
LOCATION ZONE: ARM
LOCATION ZONE: FACE

## 2022-11-17 ASSESSMENT — LOCATION DETAILED DESCRIPTION DERM
LOCATION DETAILED: RIGHT DISTAL DORSAL FOREARM
LOCATION DETAILED: RIGHT INFERIOR UPPER BACK
LOCATION DETAILED: LEFT PROXIMAL DORSAL FOREARM
LOCATION DETAILED: LEFT SUPERIOR MEDIAL BUCCAL CHEEK
LOCATION DETAILED: RIGHT SUPERIOR LATERAL BUCCAL CHEEK
LOCATION DETAILED: HAIR
LOCATION DETAILED: RIGHT VENTRAL PROXIMAL FOREARM
LOCATION DETAILED: RIGHT CENTRAL MALAR CHEEK
LOCATION DETAILED: RIGHT MID-UPPER BACK
LOCATION DETAILED: RIGHT SUPERIOR UPPER BACK
LOCATION DETAILED: LEFT MID PREAURICULAR CHEEK
LOCATION DETAILED: LEFT CENTRAL MALAR CHEEK

## 2022-11-17 ASSESSMENT — LOCATION SIMPLE DESCRIPTION DERM
LOCATION SIMPLE: RIGHT CHEEK
LOCATION SIMPLE: RIGHT FOREARM
LOCATION SIMPLE: HAIR
LOCATION SIMPLE: RIGHT UPPER BACK
LOCATION SIMPLE: LEFT FOREARM
LOCATION SIMPLE: LEFT CHEEK

## 2022-11-17 NOTE — PROCEDURE: MIPS QUALITY
Quality 154 Part B: Falls: Risk Screening (Should Be Reported With Measure 155.): Patient screened for future fall risk; documentation of no falls in the past year or only one fall without injury in the past year
Quality 226: Preventive Care And Screening: Tobacco Use: Screening And Cessation Intervention: Patient screened for tobacco use and is an ex/non-smoker
Quality 111:Pneumonia Vaccination Status For Older Adults: Pneumococcal vaccine (PPSV23) administered on or after patient’s 60th birthday and before the end of the measurement period
Quality 154 Part A: Falls: Risk Assessment (Should Be Reported With Measure 155.): Falls risk assessment completed and documented in the past 12 months.
Detail Level: Detailed
Quality 155 (Denominator): Falls Plan Of Care: Plan of Care not Documented, Reason not Otherwise Specified
Quality 431: Preventive Care And Screening: Unhealthy Alcohol Use - Screening: Patient not identified as an unhealthy alcohol user when screened for unhealthy alcohol use using a systematic screening method
Quality 110: Preventive Care And Screening: Influenza Immunization: Influenza Immunization previously received during influenza season